# Patient Record
Sex: FEMALE | Race: WHITE | NOT HISPANIC OR LATINO | Employment: UNEMPLOYED | URBAN - METROPOLITAN AREA
[De-identification: names, ages, dates, MRNs, and addresses within clinical notes are randomized per-mention and may not be internally consistent; named-entity substitution may affect disease eponyms.]

---

## 2017-01-27 ENCOUNTER — HOSPITAL ENCOUNTER (EMERGENCY)
Facility: OTHER | Age: 42
Discharge: HOME | End: 2017-01-27
Attending: EMERGENCY MEDICINE

## 2017-01-27 VITALS
WEIGHT: 135 LBS | DIASTOLIC BLOOD PRESSURE: 69 MMHG | OXYGEN SATURATION: 98 % | HEART RATE: 84 BPM | BODY MASS INDEX: 22.49 KG/M2 | TEMPERATURE: 98 F | HEIGHT: 65 IN | SYSTOLIC BLOOD PRESSURE: 95 MMHG | RESPIRATION RATE: 18 BRPM

## 2017-01-27 DIAGNOSIS — S00.83XA FACIAL CONTUSION, INITIAL ENCOUNTER: ICD-10-CM

## 2017-01-27 DIAGNOSIS — F10.920 ALCOHOL INTOXICATION, UNCOMPLICATED: Primary | ICD-10-CM

## 2017-01-27 LAB
B-HCG UR QL: NEGATIVE
CTP QC/QA: YES

## 2017-01-27 PROCEDURE — 99284 EMERGENCY DEPT VISIT MOD MDM: CPT | Mod: 25

## 2017-01-27 PROCEDURE — 81025 URINE PREGNANCY TEST: CPT | Performed by: EMERGENCY MEDICINE

## 2017-01-27 NOTE — ED AVS SNAPSHOT
"          [facility]  [address]  [facility] [address]               Shama M Levar   2017  4:59 PM   ED    Description:  Female : 1975   Department:  [facility]           Your Care was Coordinated By:     Provider Role From To    Roland Segal MD Attending Provider 17 1701 --      Reason for Visit     Alcohol Intoxication           Diagnoses this Visit        Comments    Alcohol intoxication, uncomplicated    -  Primary     Facial contusion, initial encounter           ED Disposition     None           To Do List           Follow-up Information     Follow up with [facility]. Schedule an appointment as soon as possible for a visit in 1 week.    Specialty:  Internal Medicine    Contact information:    [address]  [facility] [address] [phone]    Additional information:    [facility], 8th Floor, [address]   Please park in [facility] Parking Garage.      [facility]     [facility] -  Assistance  Registered nurses in the [facility] provide clinical advisement, health education, appointment booking, and other advisory services.  Call for this free service at [phone].             Medications                Verify that the below list of medications is an accurate representation of the medications you are currently taking.  If none reported, the list may be blank. If incorrect, please contact your healthcare provider. Carry this list with you in case of emergency.                Clinical Reference Information           Your Vitals Were     BP Pulse Temp Resp Height Weight    86/55 84 97.6 °F (36.4 °C) (Oral) 18 5' 5" (1.651 m) 61.2 kg (135 lb)    SpO2 BMI             98% 22.47 kg/m2         Allergies as of 2017     No Known Allergies      Immunizations Administered on Date of Encounter - 2017     None      ED Micro, Lab, POCT     Start " "Ordered       Status Ordering Provider    01/27/17 1755 01/27/17 1754  POCT urine pregnancy  Once      Final result       ED Imaging Orders     Start Ordered       Status Ordering Provider    01/27/17 1755 01/27/17 1754  CT Head Without Contrast  1 time imaging      Final result     01/27/17 1755 01/27/17 1754  CT Maxillofacial Without Contrast  1 time imaging      Final result         Discharge Instructions         Alcohol Abuse  Alcoholic drinks are harmful when you have too many of them. There is no set number of drinks that defines too much. Drinking that disrupts your life or your health is called alcohol abuse. Alcohol abuse can hurt your relationships with others. You may lose friends, a spouse, or even your job. You may be abusing alcohol if any of the following are true for you:  · Duties at home or with  suffer because of drinking.  · Duties at work or in school suffer because of drinking.  · You have missed work or school because of drinking.  · You use alcohol while driving or operating machinery.  · You have legal problems such as arrests due to drinking.  · You keep drinking even though it causes serious problems in your life.  Health effects  Alcohol abuse causes health problems. Sometimes this can happen after only drinking a little." There is no set number of drinks or amount of alcohol that defines too much. The more you drink at one time, and the more often you drink determine both the short-term and long-term health effects. It affects all parts of your body and your health, including your:  · Brain. Alcohol is a central nervous system depressant. It can damage parts of the brain that affect your balance, memory, thinking, and emotions. It can cause memory loss, blackouts, depression, agitation, sleep cycle changes, and seizures. These changes may or may not be reversible.  · Heart and vascular system. Alcohol affects multiple areas. It can damage heart muscle causing cardiomyopathy, " which is a weakening and stretching of the heart muscle. This can lead to trouble breathing, an irregular heartbeat, atrial fibrillation, leg swelling, and heart failure. Alcohol use makes the blood vessels stiffen causing high blood pressure. All of these problems increase your risk of having heart attacks or strokes.  · Liver. Alcohol causes fat to build up in the liver, affecting its normal function. This increases the risk for hepatitis, leading to abdominal pain, appetite loss, jaundice, bleeding problems, liver fibrosis, and cirrhosis. This, in turn, can affect your ability to fight off infections, and can cause diabetes. The liver changes prevent it from removing toxins in your blood that can cause encephalopathy, which may show with confusion, altered level of consciousness, personality changes, memory loss, seizures, coma, and death.  · Pancreas. Alcohol can cause inflammation of the pancreas, or pancreatitis. This can cause abdominal pain, fever, and diabetes.  · Immune system. Alcohol weakens your immune system in a number of ways. It suppresses your immune system making it harder to fight infections and colds. It also increases the chance of getting pneumonia and tuberculosis.  · Cancer. Alcohol is a risk factor for developing cancer of the mouth, esophagus, pharynx, larynx, liver, and breast.  · Sexual function. Alcohol can lead to sexual problems.  Home care  The following guidelines will help you deal with alcohol abuse:  · Admit you have a problem with alcohol.  · Ask for help from your healthcare provider and trusted family members or close friends.  · Get help from people trained in dealing with alcohol abuse. This may be individual counseling or group therapy, or it may be a supervised alcohol treatment program.  · Join a self-help group for alcohol abuse such as Alcoholics Anonymous (AA).  · Avoid people who abuse alcohol or tempt you to drink.  Follow-up care  Follow up as advised by the  healthcare provider, or as advised. Contact these groups to get help:  · Alcoholics Anonymous (AA): Go to www.aa.org or check the phone book for meetings near you.  · National Alcohol and Substance Abuse Information Center (NASAIC): 859.750.3940 www.addictionL & [employer] Property [employer]ments.[employer]  · National [place] on Alcoholism and Drug Dependence (NCADD): 156-WJW-UQDO (015-6595) www.ncadd.org  Call 911  Call 911 if any of these occur:  · Trouble breathing or slow irregular breathing  · Chest pain  · Sudden weakness on one side of your body or sudden trouble speaking  · Heavy bleeding or vomiting blood  · Very drowsy or trouble awakening  · Fainting or loss of consciousness  · Rapid heart rate  · Seizure  When to seek medical care  Call your healthcare provider right away if any of these occur:   · Confusion  · Hallucinations (seeing, hearing, or feeling things that arent there)  · Pain in your upper abdomen that gets worse  · Repeated vomiting or black or tarry stools  · Severe shakiness  © 4029-4251 [employer]. [address] [place] [address]. All rights reserved. This information is not intended as a substitute for professional medical care. Always follow your healthcare professional's instructions.          Facial Contusion  A contusion is another word for a bruise. It happens when small blood vessels break open and leak blood into the nearby area. A facial contusion can result from a bump, hit, or fall. This may happen during sports or an accident. Symptoms of a contusion often include changes in skin color (bruising), swelling, and pain.   The swelling from the contusion should decrease in a few days. Bruising and pain may take several weeks to go away.   Home care  · If you have been prescribed medicines for pain, take them as directed.  · To help reduce swelling and pain, wrap a cold pack or bag of frozen peas in a thin towel. Put it on the injured area for up to 20 minutes. Do this a few times a day  until the swelling goes down.   · If you have scrapes or cuts on your face requiring stiches or other closures, care for them as directed.  · For the next 24 hours (or longer if instructed):  ¨ Dont drink alcohol, or use sedatives or medicines that make you sleepy.  ¨ Dont drive or operate machinery.  ¨ Avoid doing anything strenuous. Dont lift or strain.  ¨ Do not return to sports or other activity that could result in another head injury.  Note about concussion  Because the injury was to your head, it is possible that a concussion (mild brain injury) could result. You don't have signs of a concussion at this time. But symptoms can show up later. Be alert for signs and symptoms of a concussion. Seek emergency medical care if any of these develop over the next hours to days:  · Headache  · Nausea or vomiting  · Dizziness  · Sensitivity to light or noise  · Unusual sleepiness or grogginess  · Trouble falling asleep  · Personality changes  · Vision changes  · Memory loss  · Confusion  · Trouble walking or clumsiness  · Loss of consciousness (even for a short time)  · Inability to be awakened   Follow-up care  Follow up with your healthcare provider or our staff as directed.  When to seek medical advice  Call your healthcare provider right away if any of these occur:  · Swelling or pain that gets worse, not better  · New swelling or pain  · Warmth or drainage from the swollen area or from cuts or scrapes  · Fluid drainage or bleeding from the nose or ears  · Fever of 100.4ºF (38ºC) or higher, or as directed by your healthcare provider  © 7010-6040 [employer] [address] [place] [address], [place] [address]. All rights reserved. This information is not intended as a substitute for professional medical care. Always follow your healthcare professional's instructions.          [facility] Sign-Up     Activating your [facility] account is as easy as 1-2-3!     1) Visit [email or web address], select Sign Up Now, enter this  activation code and your date of birth, then select Next.  [email or web address]  Expires: 2/5/2017  6:37 PM      2) Create a username and password to use when you visit [facility] in the future and select a security question in case you lose your password and select Next.    3) Enter your e-mail address and click Sign Up!    Additional Information  If you have questions, please e-mail [email or web address] or call [phone] to talk to our [facility] staff. Remember, [facility] is NOT to be used for urgent needs. For medical emergencies, dial 911.         Smoking Cessation     If you would like to quit smoking:   You may be eligible for free services if you are a [place] resident and started smoking cigarettes before September 1, 1988.  Call the Smoking Cessation Trust (SCT) toll free at (166) 378-4913 or (166) 366-2122.   Call 7-791-QUIT-NOW if you do not meet the above criteria.             [facility] complies with applicable Federal civil rights laws and does not discriminate on the basis of race, color, national origin, age, disability, or sex.        Language Assistance Services     ATTENTION: Language assistance services are available, free of charge. Please call [phone].      ATENCIÓN: Si habla español, tiene a chakraborty disposición servicios gratuitos de asistencia lingüística. Llame al [phone].     CHÚ Ý: N?u b?n nói Ti?ng Vi?t, có các d?ch v? h? tr? ngôn ng? mi?n phí dành cho b?n. G?i s? [phone].

## 2017-01-27 NOTE — ED PROVIDER NOTES
"Encounter Date: 1/27/2017    SCRIBE #1 NOTE: I, Ger Nunez, am scribing for, and in the presence of,  Dr. Segal. I have scribed the entire note.       History     Chief Complaint   Patient presents with    Alcohol Intoxication     PT found at her home intoxicated and [facility] called EMS to take pt to hospital.      Review of patient's allergies indicates:  No Known Allergies  HPI Comments: Time seen by provider: 5:40 PM    This is a 41 y.o. female who presents via EMS with complaint of alcohol intoxication. EMS report that [facility] called them after finding the patient at her home after "drinking too much." She admits to smoking cigarettes but denies drug use and narcotic use. She denies any h/o withdrawal when she doesn't drink. When she got off the EMS stretcher she hit her face on the stretcher side bars, no witnessed LOC. She has bruising on her L face but states this is from injury months ago. She is a poor historian and unable to give any additional information, but denies daily EtOH use or other complaints.    The history is provided by the patient.     Past Medical History   Diagnosis Date    Alcohol abuse      No past medical history pertinent negatives.  No past surgical history on file.  No family history on file.  Social History   Substance Use Topics    Smoking status: Unknown If Ever Smoked    Smokeless tobacco: Not on file    Alcohol use 12.0 oz/week     20 Shots of liquor per week     Review of Systems   Unable to perform ROS: Other   Constitutional:        + EtOH       Physical Exam   Initial Vitals   BP Pulse Resp Temp SpO2   01/27/17 1706 01/27/17 1706 01/27/17 1706 01/27/17 1706 01/27/17 1706   99/60 105 18 97.6 °F (36.4 °C) 96 %     Physical Exam    Nursing note and vitals reviewed.  Constitutional: She appears well-developed and well-nourished. She is not diaphoretic. No distress.   Patient appears intoxicated.    HENT:   Head: Normocephalic and atraumatic.   Mouth/Throat: Oropharynx is " clear and moist.   Left forehead and periorbital ecchymosis with mild tenderness.    Eyes: Conjunctivae are normal. Pupils are equal, round, and reactive to light. Right eye exhibits no discharge. Left eye exhibits no discharge.   Neck: Normal range of motion.   No C spine tenderness.    Cardiovascular: Normal rate, regular rhythm and normal heart sounds. Exam reveals no gallop and no friction rub.    No murmur heard.  Pulmonary/Chest: Breath sounds normal. No respiratory distress. She has no wheezes. She has no rhonchi. She has no rales.   Abdominal: Soft. Bowel sounds are normal. She exhibits no distension. There is no tenderness. There is no rebound and no guarding.   Musculoskeletal: Normal range of motion. She exhibits no edema or tenderness.   Neurological: She is alert. She has normal strength. No cranial nerve deficit or sensory deficit.   No focal neuro deficits.    Skin: Skin is warm and dry. No rash and no abscess noted. No erythema. No pallor.         ED Course   Procedures  Labs Reviewed   POCT URINE PREGNANCY - Normal          X-Rays:   Independently Interpreted Readings:   Head CT: No hemorrhage.  No skull fracture.      Imaging Results         CT Maxillofacial Without Contrast (Final result) Result time:  01/27/17 20:35:31    Final result by Eddy Gruber MD (01/27/17 20:35:31)    Impression:        1. Lateral left frontal scalp mild soft tissue swelling/contusion without displaced skull fracture or acute intracranial abnormality identified.    2. Left infraorbital/malar facial mild soft tissue swelling/contusion without maxillofacial acute displaced fracture or dislocation.      Electronically signed by: EDDY GRUBER MD, MD  Date:     01/27/17  Time:    20:35     Narrative:    COMPARISON: None    FINDINGS:     Head CT: Lateral left frontal scalp mild soft tissue swelling/contusion. No displaced skull fracture. The brain appears normally formed without evidence of hemorrhage, mass, midline shift or  acute major vascular territory infarct.  Gray-white interface appears intact.  The ventricular system is normal in size for age and demonstrates no distortion by mass effect.  No extra-axial hemorrhage or mass.    Maxillofacial CT: Beam hardening with streak artifact from dental hardware slightly limits evaluation. Left infraorbital/malar facial mild soft tissue swelling/contusion. No subcutaneous emphysema or radiodense retained foreign body. Chronic appearing deformity of the anterior right lamina papyracea. Bilateral orbits appear symmetric and intact. Bilateral orbital rims, zygomatic arches, pterygoid plates, mandibular condyles, TMJs and nasal bones appear symmetric and intact. Bony nasal septum is relatively midline and intact. There are a few scattered missing teeth. Minimal mucosal thickening of the left maxillary sinus. Inner ears and mastoid air cells are clear. Included airway is midline and patent. Bilateral parotid and submandibular glands are within normal limits. Several scattered subcentimeter lymph nodes along the bilateral cervical chains. Include upper cervical spine appears intact. Minimal atherosclerotic calcifications at the left carotid bifurcation.            CT Head Without Contrast (Final result) Result time:  01/27/17 20:35:31    Final result by Eddy Gruber MD (01/27/17 20:35:31)    Impression:        1. Lateral left frontal scalp mild soft tissue swelling/contusion without displaced skull fracture or acute intracranial abnormality identified.    2. Left infraorbital/malar facial mild soft tissue swelling/contusion without maxillofacial acute displaced fracture or dislocation.      Electronically signed by: EDDY GRUBER MD, MD  Date:     01/27/17  Time:    20:35     Narrative:    COMPARISON: None    FINDINGS:     Head CT: Lateral left frontal scalp mild soft tissue swelling/contusion. No displaced skull fracture. The brain appears normally formed without evidence of hemorrhage, mass,  midline shift or acute major vascular territory infarct.  Gray-white interface appears intact.  The ventricular system is normal in size for age and demonstrates no distortion by mass effect.  No extra-axial hemorrhage or mass.    Maxillofacial CT: Beam hardening with streak artifact from dental hardware slightly limits evaluation. Left infraorbital/malar facial mild soft tissue swelling/contusion. No subcutaneous emphysema or radiodense retained foreign body. Chronic appearing deformity of the anterior right lamina papyracea. Bilateral orbits appear symmetric and intact. Bilateral orbital rims, zygomatic arches, pterygoid plates, mandibular condyles, TMJs and nasal bones appear symmetric and intact. Bony nasal septum is relatively midline and intact. There are a few scattered missing teeth. Minimal mucosal thickening of the left maxillary sinus. Inner ears and mastoid air cells are clear. Included airway is midline and patent. Bilateral parotid and submandibular glands are within normal limits. Several scattered subcentimeter lymph nodes along the bilateral cervical chains. Include upper cervical spine appears intact. Minimal atherosclerotic calcifications at the left carotid bifurcation.             Medical Decision Making:   History:   Old Medical Records: I decided to obtain old medical records.  Independently Interpreted Test(s):   I have ordered and independently interpreted X-rays - see prior notes.  Clinical Tests:   Lab Tests: Ordered and Reviewed  Radiological Study: Ordered and Reviewed  ED Management:      Patient with Hx of alcohol abuse brought in by EMS for public intoxication. She has signs of facial and head trauma though it is unclear if from previous injury or today. I will get a CT head and facial and observe until sober. No other complaints or findings to warrant labs or further workup.    Update:  CT with no facial fracture or intra-cranial injury. Pt on re-assess ambulatory with no imbalance,  and A+O x4. She states she has to get to work on [employer] and discharged in stable condition.                Scribe Attestation:   Scribe #1: I performed the above scribed service and the documentation accurately describes the services I performed. I attest to the accuracy of the note.    Attending Attestation:           Physician Attestation for Scribe:  Physician Attestation Statement for Scribe #1: I, Dr. Segal, reviewed documentation, as scribed by Ger Nunez in my presence, and it is both accurate and complete.                 ED Course     Clinical Impression:     1. Alcohol intoxication, uncomplicated    2. Facial contusion, initial encounter               Roland Segal MD  01/27/17 7023

## 2017-01-27 NOTE — ED NOTES
Pt had witnessed fall with EMS on unit. Tech states that EMS unbuckled pt from stretcher and assisted her to restroom. Upon pt entering restroom and using restroom, pt pulled up her pants, and walked toward door and slipped and hit L side of her head on the assist bar. Pt assisted up by tech. Pt awake, alert, and responding as baseline upon entering ED with EMS. Pt has bruising noted to L side oh head near eye. MD notified and at bedside. No deformities noted. Will continue to monitor.

## 2017-01-28 NOTE — ED NOTES
"Rounding on pt complete. Pt is asleep arouses with stimulation. PT breath has strong odor of ETOH. Asked pt if she would be able to provide urine sample and she states "No." No needs assessed at this time, pt is clearly visible from the nurses station.   "

## 2017-01-28 NOTE — DISCHARGE INSTRUCTIONS
"  Alcohol Abuse  Alcoholic drinks are harmful when you have too many of them. There is no set number of drinks that defines too much. Drinking that disrupts your life or your health is called alcohol abuse. Alcohol abuse can hurt your relationships with others. You may lose friends, a spouse, or even your job. You may be abusing alcohol if any of the following are true for you:  · Duties at home or with  suffer because of drinking.  · Duties at work or in school suffer because of drinking.  · You have missed work or school because of drinking.  · You use alcohol while driving or operating machinery.  · You have legal problems such as arrests due to drinking.  · You keep drinking even though it causes serious problems in your life.  Health effects  Alcohol abuse causes health problems. Sometimes this can happen after only drinking a little." There is no set number of drinks or amount of alcohol that defines too much. The more you drink at one time, and the more often you drink determine both the short-term and long-term health effects. It affects all parts of your body and your health, including your:  · Brain. Alcohol is a central nervous system depressant. It can damage parts of the brain that affect your balance, memory, thinking, and emotions. It can cause memory loss, blackouts, depression, agitation, sleep cycle changes, and seizures. These changes may or may not be reversible.  · Heart and vascular system. Alcohol affects multiple areas. It can damage heart muscle causing cardiomyopathy, which is a weakening and stretching of the heart muscle. This can lead to trouble breathing, an irregular heartbeat, atrial fibrillation, leg swelling, and heart failure. Alcohol use makes the blood vessels stiffen causing high blood pressure. All of these problems increase your risk of having heart attacks or strokes.  · Liver. Alcohol causes fat to build up in the liver, affecting its normal function. This " increases the risk for hepatitis, leading to abdominal pain, appetite loss, jaundice, bleeding problems, liver fibrosis, and cirrhosis. This, in turn, can affect your ability to fight off infections, and can cause diabetes. The liver changes prevent it from removing toxins in your blood that can cause encephalopathy, which may show with confusion, altered level of consciousness, personality changes, memory loss, seizures, coma, and death.  · Pancreas. Alcohol can cause inflammation of the pancreas, or pancreatitis. This can cause abdominal pain, fever, and diabetes.  · Immune system. Alcohol weakens your immune system in a number of ways. It suppresses your immune system making it harder to fight infections and colds. It also increases the chance of getting pneumonia and tuberculosis.  · Cancer. Alcohol is a risk factor for developing cancer of the mouth, esophagus, pharynx, larynx, liver, and breast.  · Sexual function. Alcohol can lead to sexual problems.  Home care  The following guidelines will help you deal with alcohol abuse:  · Admit you have a problem with alcohol.  · Ask for help from your healthcare provider and trusted family members or close friends.  · Get help from people trained in dealing with alcohol abuse. This may be individual counseling or group therapy, or it may be a supervised alcohol treatment program.  · Join a self-help group for alcohol abuse such as Alcoholics Anonymous (AA).  · Avoid people who abuse alcohol or tempt you to drink.  Follow-up care  Follow up as advised by the healthcare provider, or as advised. Contact these groups to get help:  · Alcoholics Anonymous (AA): Go to www.aa.org or check the phone book for meetings near you.  · National Alcohol and Substance Abuse Information Center (NASAIC): 538.295.4030 www.addictioncareoptions.com  · National [place] on Alcoholism and Drug Dependence (NCADD): 743-HCC-LPFP (845-8722) www.ncadd.org  Call 911  Call 911 if any of these  occur:  · Trouble breathing or slow irregular breathing  · Chest pain  · Sudden weakness on one side of your body or sudden trouble speaking  · Heavy bleeding or vomiting blood  · Very drowsy or trouble awakening  · Fainting or loss of consciousness  · Rapid heart rate  · Seizure  When to seek medical care  Call your healthcare provider right away if any of these occur:   · Confusion  · Hallucinations (seeing, hearing, or feeling things that arent there)  · Pain in your upper abdomen that gets worse  · Repeated vomiting or black or tarry stools  · Severe shakiness  © 8941-9996 [employer]. [address] [place] [address]. All rights reserved. This information is not intended as a substitute for professional medical care. Always follow your healthcare professional's instructions.          Facial Contusion  A contusion is another word for a bruise. It happens when small blood vessels break open and leak blood into the nearby area. A facial contusion can result from a bump, hit, or fall. This may happen during sports or an accident. Symptoms of a contusion often include changes in skin color (bruising), swelling, and pain.   The swelling from the contusion should decrease in a few days. Bruising and pain may take several weeks to go away.   Home care  · If you have been prescribed medicines for pain, take them as directed.  · To help reduce swelling and pain, wrap a cold pack or bag of frozen peas in a thin towel. Put it on the injured area for up to 20 minutes. Do this a few times a day until the swelling goes down.   · If you have scrapes or cuts on your face requiring stiches or other closures, care for them as directed.  · For the next 24 hours (or longer if instructed):  ¨ Dont drink alcohol, or use sedatives or medicines that make you sleepy.  ¨ Dont drive or operate machinery.  ¨ Avoid doing anything strenuous. Dont lift or strain.  ¨ Do not return to sports or other activity that  could result in another head injury.  Note about concussion  Because the injury was to your head, it is possible that a concussion (mild brain injury) could result. You don't have signs of a concussion at this time. But symptoms can show up later. Be alert for signs and symptoms of a concussion. Seek emergency medical care if any of these develop over the next hours to days:  · Headache  · Nausea or vomiting  · Dizziness  · Sensitivity to light or noise  · Unusual sleepiness or grogginess  · Trouble falling asleep  · Personality changes  · Vision changes  · Memory loss  · Confusion  · Trouble walking or clumsiness  · Loss of consciousness (even for a short time)  · Inability to be awakened   Follow-up care  Follow up with your healthcare provider or our staff as directed.  When to seek medical advice  Call your healthcare provider right away if any of these occur:  · Swelling or pain that gets worse, not better  · New swelling or pain  · Warmth or drainage from the swollen area or from cuts or scrapes  · Fluid drainage or bleeding from the nose or ears  · Fever of 100.4ºF (38ºC) or higher, or as directed by your healthcare provider  © 3504-3077 [employer]. [address] [place] [address]. All rights reserved. This information is not intended as a substitute for professional medical care. Always follow your healthcare professional's instructions.

## 2017-01-28 NOTE — ED NOTES
Bedside report rec'ed care assumed. Pt resting quietly on stretcher. Arouses to minimal stimulation. No complaints at this time. Resp even and unlabored. On pulse ox and NIBP.

## 2017-01-28 NOTE — ED NOTES
CT came for pt, and pt has not provided urine, MD notified and stated to wait on CT until pt provides urine.

## 2017-01-28 NOTE — ED NOTES
Pt ambulated to bathroom with steady gait and voided without difficulty, nurse remained in bathroom with patient to maintain safety. Pt ambulated back to room with no difficulty and given water to drink, pt is talking with clear speech and radiology notified pt is ready to go to CT now. Pt taken by stretched to CT with 2 radiology techs present.

## 2017-05-23 ENCOUNTER — HOSPITAL ENCOUNTER (EMERGENCY)
Facility: OTHER | Age: 42
Discharge: HOME | End: 2017-05-23
Attending: EMERGENCY MEDICINE
Payer: MEDICAID

## 2017-05-23 VITALS
DIASTOLIC BLOOD PRESSURE: 64 MMHG | HEIGHT: 60 IN | OXYGEN SATURATION: 99 % | BODY MASS INDEX: 24.15 KG/M2 | RESPIRATION RATE: 17 BRPM | HEART RATE: 96 BPM | TEMPERATURE: 98 F | WEIGHT: 123 LBS | SYSTOLIC BLOOD PRESSURE: 119 MMHG

## 2017-05-23 DIAGNOSIS — F10.920 ALCOHOL INTOXICATION, UNCOMPLICATED: Primary | ICD-10-CM

## 2017-05-23 PROCEDURE — 99283 EMERGENCY DEPT VISIT LOW MDM: CPT

## 2017-05-23 NOTE — ED PROVIDER NOTES
Encounter Date: 5/23/2017    SCRIBE #1 NOTE: I, Cecilia Ivan, am scribing for, and in the presence of,  Dr. Clayton. I have scribed the entire note.       History     Chief Complaint   Patient presents with    Alcohol Intoxication     [place] and was unable to ambulate, wit slurred speech, and nystagmus.     Review of patient's allergies indicates:  No Known Allergies  Time seen by provider: 5:33 PM    This is a 42 y.o. female who presents with alcohol intoxication. As per EMS the patient's symptoms began hours ago. EMS note [place]. The patient admits to drinking several alcoholic beverages. She reports she currently has no complaints. The patient does not admit to any chest pain, shortness of breath, palpitations, abdominal pain, nausea, vomiting, diarrhea, urinary symptoms, fever or chills. As per EMS the patient has no signs of trauma.       The history is provided by the EMS personnel and the patient.     Past Medical History:   Diagnosis Date    Alcohol abuse      History reviewed. No pertinent surgical history.  History reviewed. No pertinent family history.  Social History   Substance Use Topics    Smoking status: Unknown If Ever Smoked    Smokeless tobacco: Not on file    Alcohol use 12.0 oz/week     20 Shots of liquor per week     Review of Systems   Constitutional: Negative for chills and fever.   HENT: Negative for congestion and sore throat.    Eyes: Negative for redness and visual disturbance.   Respiratory: Negative for cough and shortness of breath.    Cardiovascular: Negative for chest pain and palpitations.   Gastrointestinal: Negative for abdominal pain, diarrhea, nausea and vomiting.   Genitourinary: Negative for dysuria.   Musculoskeletal: Negative for back pain.   Skin: Negative for rash.   Neurological: Negative for weakness and headaches.   Psychiatric/Behavioral: Negative for confusion.       Physical Exam      Initial Vitals [05/23/17 1731]   BP Pulse Resp Temp SpO2   (!) 93/55 99 16 98.4 °F (36.9 °C) (!) 92 %     Physical Exam    Nursing note and vitals reviewed.  Constitutional: She appears well-developed and well-nourished. She is not diaphoretic. No distress.   HENT:   Head: Normocephalic and atraumatic.   Right Ear: External ear normal.   Left Ear: External ear normal.   Eyes: Conjunctivae and EOM are normal.   Neck: Normal range of motion. Neck supple.   Cardiovascular: Normal rate, regular rhythm and normal heart sounds. Exam reveals no gallop and no friction rub.    No murmur heard.  Pulmonary/Chest: Breath sounds normal. She has no wheezes. She has no rhonchi. She has no rales.   Abdominal: Soft. Bowel sounds are normal. There is no tenderness. There is no rebound and no guarding.   Musculoskeletal: Normal range of motion. She exhibits no edema or tenderness.   Lymphadenopathy:     She has no cervical adenopathy.   Neurological: She is alert and oriented to person, place, and time. She has normal strength.   Skin: Skin is warm and dry. No rash noted.         ED Course   Procedures  Labs Reviewed - No data to display          Medical Decision Making:   ED Management:  Shama Cristobal is a 42 y.o. female Smells of alcohol and appears to be clinically intoxicated.  Vital signs otherwise normal. No signs of trauma.  At this time I do not feel any urgent intervention such as IV fluids or any blood work is indicated.  There is no indication for radiologic studies at this time.  We'll observe the patient and reevaluate when he is clinically sober, with no slurred speech, being alert and oriented, and able to ambulate on their own.     9:03 PM I have reevaluated the patient.  They are now alert and oriented person place and time.  They are answering all questions appropriately.  They have no slurred speech and having normal non-ataxic gait.  At this time I feel they're clinically sober.  They have medical  decision-making capacity and will be discharged.               Attending Attestation:           Physician Attestation for Scribe:  Physician Attestation Statement for Scribe #1: I, Dr. Clayton, reviewed documentation, as scribed by Cecilia Ivan in my presence, and it is both accurate and complete.                 ED Course     Clinical Impression:     1. Alcohol intoxication, uncomplicated              Saeid Clayton, DO  05/23/17 0730

## 2017-05-23 NOTE — ED TRIAGE NOTES
Pt transferred from EMS stretcher to bed 3, pt reports to ED via [facility] c/o ETOH intoxication. [place]. Pt appears dishelved with slurred speech, + nystagmus, admits to ETOH consumption, denies drug use. Spo2 97% on RA.

## 2017-05-23 NOTE — ED NOTES
No change in pt status; pt sleeping in stretcher with even, unlabored respiration. Bed in lowest, locked position, side rails up x 2. Remains near nurse's station for close observation.

## 2017-05-24 ENCOUNTER — HOSPITAL ENCOUNTER (EMERGENCY)
Facility: OTHER | Age: 42
Discharge: HOME | End: 2017-05-25
Attending: EMERGENCY MEDICINE
Payer: MEDICAID

## 2017-05-24 DIAGNOSIS — F10.920 ALCOHOL INTOXICATION, UNCOMPLICATED: Primary | ICD-10-CM

## 2017-05-24 PROCEDURE — 99283 EMERGENCY DEPT VISIT LOW MDM: CPT

## 2017-05-24 NOTE — ED PROVIDER NOTES
Encounter Date: 5/24/2017    SCRIBE #1 NOTE: I, Kimberlyhalima Vergaraher, am scribing for, and in the presence of,  Dr. Navarro I have scribed the entire note.       History     Chief Complaint   Patient presents with    Alcohol Intoxication     pt was passed out [address]  nopd called EMS to pick pt up     Review of patient's allergies indicates:  No Known Allergies  Time seen by provider: 6:04 PM    This is a 42 y.o. female who presents to the ED by EMS because of alcohol intoxication. The patient was found in the [facility] by [place] [employer], and EMS was called. She admits to drinking. The patient has a history of alcohol abuse. She denies medical problems and pain. She denies any drug use.  She denies nausea, vomiting, diarrhea, SOB, or chest pain. The patient has no complaints at this time.       The history is provided by the patient. The history is limited by the condition of the patient.     Past Medical History:   Diagnosis Date    Alcohol abuse      History reviewed. No pertinent surgical history.  History reviewed. No pertinent family history.  Social History   Substance Use Topics    Smoking status: Unknown If Ever Smoked    Smokeless tobacco: Not on file    Alcohol use 12.0 oz/week     20 Shots of liquor per week     Review of Systems   Unable to perform ROS: Other (Severe alcohol intoxication)       Physical Exam     Initial Vitals [05/24/17 1802]   BP Pulse Resp Temp SpO2   102/66 91 18 97.8 °F (36.6 °C) 96 %     Physical Exam    Nursing note and vitals reviewed.  Constitutional: She appears well-developed and well-nourished. She is not diaphoretic. No distress.   Sleepy but arousable   HENT:   Head: Normocephalic and atraumatic.   Right Ear: External ear normal.   Left Ear: External ear normal.   Mouth/Throat: Oropharynx is clear and moist.   Eyes: Conjunctivae are normal. Pupils are equal, round, and reactive to light. Right eye exhibits no discharge. Left eye exhibits no  discharge.   Nystagmus   Neck: Normal range of motion. Neck supple. No JVD present.   Cardiovascular: Normal rate, regular rhythm, normal heart sounds and intact distal pulses. Exam reveals no friction rub.    Pulmonary/Chest: Breath sounds normal. No respiratory distress. She has no wheezes. She has no rhonchi. She has no rales.   Abdominal: Soft. Bowel sounds are normal. She exhibits no distension. There is no tenderness. There is no rebound and no guarding.   Musculoskeletal: Normal range of motion. She exhibits no edema or tenderness.   Neurological: She is alert. She has normal strength. No sensory deficit.   Responsive to verbal stimuli. Slurred speech.   Skin: Skin is warm and dry. Capillary refill takes less than 2 seconds. No rash noted.         ED Course   Procedures  Labs Reviewed - No data to display          Medical Decision Making:   History:   I obtained history from: EMS provider.  Old Medical Records: I decided to obtain old medical records.  Old Records Summarized: other records.  Initial Assessment:   6:04PM:  Pt is a 41 y/o F who presents to ED by EMS for ETOH intoxication.  Pt appears well, nontoxic.  She is responsive to verbal stimuli and protecting her airway with no issues. She does admit to ETOH use.  Will continue to follow and reassess.      11:00 PM:  Pt remains arousable but unable to ambulate independently.  I have discussed this patient with Dr. Boucher who will assume care of the patient. Will continue to follow.              Scribe Attestation:   Scribe #1: I performed the above scribed service and the documentation accurately describes the services I performed. I attest to the accuracy of the note.    Attending Attestation:           Physician Attestation for Scribe:  Physician Attestation Statement for Scribe #1: I, Dr. Desai, reviewed documentation, as scribed by Kimberly Cabrales in my presence, and it is both accurate and complete.                 ED Course     Clinical Impression:      1. Alcohol intoxication, uncomplicated                Kita Desai MD  05/24/17 4407

## 2017-05-25 VITALS
BODY MASS INDEX: 23.22 KG/M2 | HEIGHT: 61 IN | TEMPERATURE: 98 F | OXYGEN SATURATION: 97 % | HEART RATE: 82 BPM | WEIGHT: 123 LBS | DIASTOLIC BLOOD PRESSURE: 75 MMHG | RESPIRATION RATE: 18 BRPM | SYSTOLIC BLOOD PRESSURE: 112 MMHG

## 2017-05-25 NOTE — ED NOTES
Received report from [facility]  Pt is sleeping in stretcher.  Bed in lowest position, side rails up x2, call light in reach.

## 2020-04-08 ENCOUNTER — HOSPITAL ENCOUNTER (EMERGENCY)
Facility: HOSPITAL | Age: 45
Discharge: HOME | End: 2020-04-08
Attending: EMERGENCY MEDICINE
Payer: MEDICAID

## 2020-04-08 VITALS
SYSTOLIC BLOOD PRESSURE: 96 MMHG | WEIGHT: 130 LBS | TEMPERATURE: 98 F | HEART RATE: 96 BPM | DIASTOLIC BLOOD PRESSURE: 65 MMHG | RESPIRATION RATE: 20 BRPM | OXYGEN SATURATION: 98 % | BODY MASS INDEX: 25.52 KG/M2 | HEIGHT: 60 IN

## 2020-04-08 DIAGNOSIS — R41.82 ALTERED MENTAL STATUS: ICD-10-CM

## 2020-04-08 DIAGNOSIS — F10.920 ALCOHOLIC INTOXICATION WITHOUT COMPLICATION: Primary | ICD-10-CM

## 2020-04-08 PROCEDURE — 99283 EMERGENCY DEPT VISIT LOW MDM: CPT | Mod: 25

## 2020-04-08 NOTE — ED NOTES
Attempted to call pt's mother, Afshan, at [phone]. No answer. Voicemail left. Will continue to monitor.

## 2020-04-08 NOTE — ED PROVIDER NOTES
Encounter Date: 4/8/2020    SCRIBE #1 NOTE: I, Yakelin Boogie, am scribing for, and in the presence of,  Tai Arteaga MD. I have scribed the following portions of the note - Other sections scribed: HPI,ROS,PE.       History     Chief Complaint   Patient presents with    Alcohol Intoxication     EMS reports pt sleeping on the side of the road. apparent alcohol intoxication. pt with slurred speech.      44-year-old female with past medical history of alcohol abuse presenting to ED via EMS for alcohol intoxication. Patient was found [place] bus stop arguing with friend and had unsteady gait. She denies any pain, falls, or sick contacts.  No reported trauma.  Patient has no complaints.  Patient has visited ED on several occasions for alcohol intoxication.  History is limited due to alcohol intoxication.      The history is provided by the EMS personnel and the patient. History limited by: alcohol intoxication      Review of patient's allergies indicates:  No Known Allergies  Past Medical History:   Diagnosis Date    Alcohol abuse      No past surgical history on file.  History reviewed. No pertinent family history.  Social History     Tobacco Use    Smoking status: Unknown If Ever Smoked   Substance Use Topics    Alcohol use: Yes     Alcohol/week: 20.0 standard drinks     Types: 20 Shots of liquor per week    Drug use: No     Review of Systems   Unable to perform ROS: Other (alcohol intoxication )   Constitutional: Negative for diaphoresis.   Cardiovascular: Negative for chest pain.   Gastrointestinal: Negative for abdominal pain and vomiting.   Musculoskeletal: Negative for back pain and neck pain.   Skin: Negative for wound.   Neurological: Negative for syncope.   Psychiatric/Behavioral: Positive for confusion.       Physical Exam     Initial Vitals [04/08/20 1737]   BP Pulse Resp Temp SpO2   (!) 87/50 87 16 97.6 °F (36.4 °C) 95 %      MAP       --         Physical Exam    Nursing note and vitals  reviewed.  Constitutional: She appears well-developed and well-nourished. She is not diaphoretic. No distress.   HENT:   Head: Normocephalic and atraumatic.   Nose: Nose normal.   Mouth/Throat: Oropharynx is clear and moist.   Eyes: Conjunctivae and EOM are normal. Pupils are equal, round, and reactive to light. Right eye exhibits no discharge. Left eye exhibits no discharge. No scleral icterus.   Neck: Normal range of motion. Neck supple. No tracheal deviation present.   Cardiovascular: Normal rate, regular rhythm and normal heart sounds.   No murmur heard.  Pulmonary/Chest: Breath sounds normal. No stridor. No respiratory distress. She has no wheezes. She has no rhonchi. She has no rales.   Abdominal: She exhibits no distension.   Musculoskeletal: Normal range of motion. She exhibits no edema.   Neurological: She is alert and oriented to person, place, and time. She has normal strength. No cranial nerve deficit or sensory deficit. GCS score is 15. GCS eye subscore is 4. GCS verbal subscore is 5. GCS motor subscore is 6.   Slurred speech.  Ataxic gait.   Skin: Skin is warm and dry.   Psychiatric: Her behavior is normal. Judgment and thought content normal.   Intoxicated.  Flat affect.  Depressed mood.         ED Course   Procedures  Labs Reviewed - No data to display       Imaging Results          X-Ray Chest 1 View (Final result)  Result time 04/08/20 20:21:28    Final result by Jorden Gruber MD (04/08/20 20:21:28)                 Impression:      No radiographic acute intrathoracic process seen on this single view.      Electronically signed by: Jorden Gruber MD  Date:    04/08/2020  Time:    20:21             Narrative:    EXAMINATION:  XR CHEST 1 VIEW    CLINICAL HISTORY:  Altered mental status, unspecified    TECHNIQUE:  Single frontal view of the chest was performed.    COMPARISON:  None    FINDINGS:  Patient is slightly rotated.The lungs are clear, with normal appearance of pulmonary vasculature and no  pleural effusion or pneumothorax.    The cardiac silhouette is normal in size. The hilar and mediastinal contours are unremarkable.    Bones are intact.  PA and lateral views can be obtained.                                 Medical Decision Making:   History:   Old Medical Records: I decided to obtain old medical records.  Initial Assessment:   44-year-old female with past medical history of alcohol abuse presenting to ED via EMS for alcohol intoxication. Patient was found at a bus stop arguing with friend and had unsteady gait. Patient has slurred speech.  No evidence of trauma on exam.  No acute psychiatric disorder.  Patient is without complaints.  Patient is stable for discharge with a reliable mode of transportation once her gait is improved.  Differential Diagnosis:   Differential diagnosis includes but is not limited to: alcohol intoxication, drug abuse.   Clinical Tests:   Radiological Study: Ordered and Reviewed  ED Management:  1930:  WHILE PATIENT WAS WAITING ARRIVED HER BLOOD PRESSURE DROPPED AND HER O2 SATS DROPPED TO THE LOW 80S.  PATIENT IS MORE SOMNOLENT.  WILL CHECK LAB WORK AND GIVE IV FLUIDS.  WILL CHECK CHEST X-RAY.    2000:  Patient refused IV.  Blood pressure in O2 sats improved with improved mental status.  Suspect hypotension and hypoxia was due to her severe alcohol intoxication.  Will continue to observe.    2120:  Chest x-ray clear.  Blood pressure has improved.  In patient's mental status is slowly improving.  Cannot find a ride for her.  She will have to be able to ambulate before she can be discharged.    2150:  Patient's mental status improved.  Patient mild-to-moderately intoxicated.  Gait is stable.  Patient is alert orient x4.  Patient continues to have no complaints.  Will discharge patient with a ride in a cab or an [employer].            Scribe Attestation:   Scribe #1: I performed the above scribed service and the documentation accurately describes the services I performed. I attest  to the accuracy of the note.                          Clinical Impression:       ICD-10-CM ICD-9-CM   1. Alcoholic intoxication without complication F10.920 305.00   2. Altered mental status R41.82 780.97         Disposition:   Disposition: Discharged  Condition: Stable     I, Tai Arteaga MD, personally performed the services described in this documentation. All medical record entries made by the scribe were at my direction and in my presence. I have reviewed the chart and agree that the record reflects my personal performance and is accurate and complete.                      Tai Arteaga MD  04/08/20 2128       Tai Arteaga MD  04/08/20 3290

## 2020-04-09 NOTE — ED NOTES
Spoke to patient about starting an iv and giving fluids. Patient becomes combative and refuses.MD notified, patient to be monitored at this time, in keeping with her wishes. Vitals currently will stable. Will monitor closely.

## 2020-07-13 ENCOUNTER — HOSPITAL ENCOUNTER (INPATIENT)
Facility: HOSPITAL | Age: 45
LOS: 3 days | Discharge: HOME | DRG: 897 | End: 2020-07-16
Attending: EMERGENCY MEDICINE | Admitting: INTERNAL MEDICINE
Payer: MEDICAID

## 2020-07-13 DIAGNOSIS — R00.0 TACHYCARDIA: ICD-10-CM

## 2020-07-13 DIAGNOSIS — E87.6 HYPOKALEMIA: ICD-10-CM

## 2020-07-13 DIAGNOSIS — F10.931 ALCOHOL WITHDRAWAL SYNDROME, WITH DELIRIUM: Primary | ICD-10-CM

## 2020-07-13 DIAGNOSIS — E83.42 HYPOMAGNESEMIA: ICD-10-CM

## 2020-07-13 PROBLEM — E86.0 DEHYDRATION: Status: ACTIVE | Noted: 2020-07-13

## 2020-07-13 PROBLEM — R94.31 PROLONGED Q-T INTERVAL ON ECG: Status: ACTIVE | Noted: 2020-07-13

## 2020-07-13 PROBLEM — F10.14 ALCOHOL ABUSE WITH ALCOHOL-INDUCED MOOD DISORDER: Status: ACTIVE | Noted: 2020-07-13

## 2020-07-13 PROBLEM — F10.939 ALCOHOL WITHDRAWAL SYNDROME WITH COMPLICATION: Status: ACTIVE | Noted: 2020-07-13

## 2020-07-13 PROBLEM — F17.210 CIGARETTE NICOTINE DEPENDENCE WITHOUT COMPLICATION: Status: ACTIVE | Noted: 2020-07-13

## 2020-07-13 PROBLEM — R82.90 ABNORMAL URINALYSIS: Status: ACTIVE | Noted: 2020-07-13

## 2020-07-13 PROBLEM — R17 SERUM TOTAL BILIRUBIN ELEVATED: Status: ACTIVE | Noted: 2020-07-13

## 2020-07-13 PROBLEM — F31.9 BIPOLAR 1 DISORDER: Status: ACTIVE | Noted: 2020-07-13

## 2020-07-13 LAB
ALBUMIN SERPL BCP-MCNC: 4.2 G/DL (ref 3.5–5.2)
ALP SERPL-CCNC: 84 U/L (ref 55–135)
ALT SERPL W/O P-5'-P-CCNC: 28 U/L (ref 10–44)
AMPHET+METHAMPHET UR QL: NEGATIVE
ANION GAP SERPL CALC-SCNC: 17 MMOL/L (ref 8–16)
APAP SERPL-MCNC: <3 UG/ML (ref 10–20)
APTT BLDCRRT: 29.2 SEC (ref 21–32)
AST SERPL-CCNC: 50 U/L (ref 10–40)
B-HCG UR QL: NEGATIVE
BACTERIA #/AREA URNS HPF: ABNORMAL /HPF
BARBITURATES UR QL SCN>200 NG/ML: NEGATIVE
BASOPHILS # BLD AUTO: 0.02 K/UL (ref 0–0.2)
BASOPHILS NFR BLD: 0.2 % (ref 0–1.9)
BENZODIAZ UR QL SCN>200 NG/ML: NEGATIVE
BILIRUB SERPL-MCNC: 1.3 MG/DL (ref 0.1–1)
BILIRUB UR QL STRIP: ABNORMAL
BNP SERPL-MCNC: 80 PG/ML (ref 0–99)
BUN SERPL-MCNC: 20 MG/DL (ref 6–20)
BZE UR QL SCN: NEGATIVE
CALCIUM SERPL-MCNC: 9.4 MG/DL (ref 8.7–10.5)
CANNABINOIDS UR QL SCN: NEGATIVE
CHLORIDE SERPL-SCNC: 81 MMOL/L (ref 95–110)
CLARITY UR: ABNORMAL
CO2 SERPL-SCNC: 37 MMOL/L (ref 23–29)
COLOR UR: ABNORMAL
CREAT SERPL-MCNC: 0.7 MG/DL (ref 0.5–1.4)
CREAT UR-MCNC: 267.9 MG/DL (ref 15–325)
CTP QC/QA: YES
D DIMER PPP IA.FEU-MCNC: 0.29 MG/L FEU
DIFFERENTIAL METHOD: ABNORMAL
EOSINOPHIL # BLD AUTO: 0 K/UL (ref 0–0.5)
EOSINOPHIL NFR BLD: 0.1 % (ref 0–8)
ERYTHROCYTE [DISTWIDTH] IN BLOOD BY AUTOMATED COUNT: 13.3 % (ref 11.5–14.5)
EST. GFR  (AFRICAN AMERICAN): >60 ML/MIN/1.73 M^2
EST. GFR  (NON AFRICAN AMERICAN): >60 ML/MIN/1.73 M^2
ETHANOL SERPL-MCNC: <10 MG/DL
GLUCOSE SERPL-MCNC: 94 MG/DL (ref 70–110)
GLUCOSE UR QL STRIP: NEGATIVE
HCT VFR BLD AUTO: 41.4 % (ref 37–48.5)
HGB BLD-MCNC: 14.4 G/DL (ref 12–16)
HGB UR QL STRIP: NEGATIVE
HYALINE CASTS #/AREA URNS LPF: 5 /LPF
IMM GRANULOCYTES # BLD AUTO: 0.03 K/UL (ref 0–0.04)
IMM GRANULOCYTES NFR BLD AUTO: 0.3 % (ref 0–0.5)
INR PPP: 1 (ref 0.8–1.2)
KETONES UR QL STRIP: ABNORMAL
LEUKOCYTE ESTERASE UR QL STRIP: ABNORMAL
LIPASE SERPL-CCNC: 22 U/L (ref 4–60)
LYMPHOCYTES # BLD AUTO: 1.9 K/UL (ref 1–4.8)
LYMPHOCYTES NFR BLD: 20.2 % (ref 18–48)
MAGNESIUM SERPL-MCNC: 1.5 MG/DL (ref 1.6–2.6)
MCH RBC QN AUTO: 33.5 PG (ref 27–31)
MCHC RBC AUTO-ENTMCNC: 34.8 G/DL (ref 32–36)
MCV RBC AUTO: 96 FL (ref 82–98)
METHADONE UR QL SCN>300 NG/ML: NEGATIVE
MICROSCOPIC COMMENT: ABNORMAL
MONOCYTES # BLD AUTO: 0.7 K/UL (ref 0.3–1)
MONOCYTES NFR BLD: 7 % (ref 4–15)
NEUTROPHILS # BLD AUTO: 6.8 K/UL (ref 1.8–7.7)
NEUTROPHILS NFR BLD: 72.2 % (ref 38–73)
NITRITE UR QL STRIP: NEGATIVE
NRBC BLD-RTO: 0 /100 WBC
OPIATES UR QL SCN: NEGATIVE
PCP UR QL SCN>25 NG/ML: NEGATIVE
PH UR STRIP: 8 [PH] (ref 5–8)
PLATELET # BLD AUTO: 161 K/UL (ref 150–350)
PMV BLD AUTO: 11.5 FL (ref 9.2–12.9)
POTASSIUM SERPL-SCNC: 2.8 MMOL/L (ref 3.5–5.1)
PROT SERPL-MCNC: 7.3 G/DL (ref 6–8.4)
PROT UR QL STRIP: ABNORMAL
PROTHROMBIN TIME: 10.9 SEC (ref 9–12.5)
RBC # BLD AUTO: 4.3 M/UL (ref 4–5.4)
RBC #/AREA URNS HPF: 0 /HPF (ref 0–4)
SALICYLATES SERPL-MCNC: <5 MG/DL (ref 15–30)
SARS-COV-2 RDRP RESP QL NAA+PROBE: NEGATIVE
SODIUM SERPL-SCNC: 135 MMOL/L (ref 136–145)
SP GR UR STRIP: 1.03 (ref 1–1.03)
SQUAMOUS #/AREA URNS HPF: 3 /HPF
TOXICOLOGY INFORMATION: NORMAL
TROPONIN I SERPL DL<=0.01 NG/ML-MCNC: <0.006 NG/ML (ref 0–0.03)
TSH SERPL DL<=0.005 MIU/L-ACNC: 1.67 UIU/ML (ref 0.4–4)
URN SPEC COLLECT METH UR: ABNORMAL
UROBILINOGEN UR STRIP-ACNC: ABNORMAL EU/DL
WBC # BLD AUTO: 9.46 K/UL (ref 3.9–12.7)
WBC #/AREA URNS HPF: 15 /HPF (ref 0–5)

## 2020-07-13 PROCEDURE — 25000003 PHARM REV CODE 250: Performed by: EMERGENCY MEDICINE

## 2020-07-13 PROCEDURE — 12000002 HC ACUTE/MED SURGE SEMI-PRIVATE ROOM

## 2020-07-13 PROCEDURE — 93010 ELECTROCARDIOGRAM REPORT: CPT | Mod: ,,, | Performed by: INTERNAL MEDICINE

## 2020-07-13 PROCEDURE — 63600175 PHARM REV CODE 636 W HCPCS: Performed by: INTERNAL MEDICINE

## 2020-07-13 PROCEDURE — 83880 ASSAY OF NATRIURETIC PEPTIDE: CPT

## 2020-07-13 PROCEDURE — 96374 THER/PROPH/DIAG INJ IV PUSH: CPT

## 2020-07-13 PROCEDURE — 85730 THROMBOPLASTIN TIME PARTIAL: CPT

## 2020-07-13 PROCEDURE — 84484 ASSAY OF TROPONIN QUANT: CPT

## 2020-07-13 PROCEDURE — 63600175 PHARM REV CODE 636 W HCPCS: Performed by: EMERGENCY MEDICINE

## 2020-07-13 PROCEDURE — 87088 URINE BACTERIA CULTURE: CPT

## 2020-07-13 PROCEDURE — 83735 ASSAY OF MAGNESIUM: CPT

## 2020-07-13 PROCEDURE — 85379 FIBRIN DEGRADATION QUANT: CPT

## 2020-07-13 PROCEDURE — 63700000 PHARM REV CODE 250 ALT 637 W/O HCPCS: Performed by: EMERGENCY MEDICINE

## 2020-07-13 PROCEDURE — 84443 ASSAY THYROID STIM HORMONE: CPT

## 2020-07-13 PROCEDURE — 80320 DRUG SCREEN QUANTALCOHOLS: CPT

## 2020-07-13 PROCEDURE — 83690 ASSAY OF LIPASE: CPT

## 2020-07-13 PROCEDURE — 80329 ANALGESICS NON-OPIOID 1 OR 2: CPT

## 2020-07-13 PROCEDURE — 81025 URINE PREGNANCY TEST: CPT | Performed by: EMERGENCY MEDICINE

## 2020-07-13 PROCEDURE — 93010 EKG 12-LEAD: ICD-10-PCS | Mod: ,,, | Performed by: INTERNAL MEDICINE

## 2020-07-13 PROCEDURE — 85025 COMPLETE CBC W/AUTO DIFF WBC: CPT

## 2020-07-13 PROCEDURE — 87086 URINE CULTURE/COLONY COUNT: CPT

## 2020-07-13 PROCEDURE — 85610 PROTHROMBIN TIME: CPT

## 2020-07-13 PROCEDURE — 87186 SC STD MICRODIL/AGAR DIL: CPT

## 2020-07-13 PROCEDURE — 81000 URINALYSIS NONAUTO W/SCOPE: CPT | Mod: 59

## 2020-07-13 PROCEDURE — 93005 ELECTROCARDIOGRAM TRACING: CPT

## 2020-07-13 PROCEDURE — 25000003 PHARM REV CODE 250: Performed by: INTERNAL MEDICINE

## 2020-07-13 PROCEDURE — 80053 COMPREHEN METABOLIC PANEL: CPT

## 2020-07-13 PROCEDURE — 99285 EMERGENCY DEPT VISIT HI MDM: CPT | Mod: 25

## 2020-07-13 PROCEDURE — 96361 HYDRATE IV INFUSION ADD-ON: CPT

## 2020-07-13 PROCEDURE — 87077 CULTURE AEROBIC IDENTIFY: CPT

## 2020-07-13 PROCEDURE — 51798 US URINE CAPACITY MEASURE: CPT

## 2020-07-13 PROCEDURE — 80307 DRUG TEST PRSMV CHEM ANLYZR: CPT

## 2020-07-13 PROCEDURE — U0002 COVID-19 LAB TEST NON-CDC: HCPCS

## 2020-07-13 RX ORDER — MULTIVITAMIN
1 TABLET ORAL DAILY
Status: DISCONTINUED | OUTPATIENT
Start: 2020-07-13 | End: 2020-07-14 | Stop reason: CLARIF

## 2020-07-13 RX ORDER — CYANOCOBALAMIN (VITAMIN B-12) 250 MCG
250 TABLET ORAL
Status: COMPLETED | OUTPATIENT
Start: 2020-07-13 | End: 2020-07-13

## 2020-07-13 RX ORDER — AMOXICILLIN 250 MG
1 CAPSULE ORAL 2 TIMES DAILY PRN
Status: DISCONTINUED | OUTPATIENT
Start: 2020-07-13 | End: 2020-07-16 | Stop reason: HOSPADM

## 2020-07-13 RX ORDER — DIAZEPAM 5 MG/1
10 TABLET ORAL EVERY 6 HOURS
Status: DISCONTINUED | OUTPATIENT
Start: 2020-07-13 | End: 2020-07-14

## 2020-07-13 RX ORDER — DEXTROSE MONOHYDRATE, SODIUM CHLORIDE, AND POTASSIUM CHLORIDE 50; 2.98; 4.5 G/1000ML; G/1000ML; G/1000ML
INJECTION, SOLUTION INTRAVENOUS CONTINUOUS
Status: DISCONTINUED | OUTPATIENT
Start: 2020-07-13 | End: 2020-07-16 | Stop reason: HOSPADM

## 2020-07-13 RX ORDER — PANTOPRAZOLE SODIUM 40 MG/1
40 TABLET, DELAYED RELEASE ORAL DAILY
Status: DISCONTINUED | OUTPATIENT
Start: 2020-07-13 | End: 2020-07-16 | Stop reason: HOSPADM

## 2020-07-13 RX ORDER — FOLIC ACID 1 MG/1
1 TABLET ORAL
Status: DISCONTINUED | OUTPATIENT
Start: 2020-07-13 | End: 2020-07-13

## 2020-07-13 RX ORDER — LORAZEPAM 2 MG/ML
2 INJECTION INTRAMUSCULAR EVERY 4 HOURS PRN
Status: DISCONTINUED | OUTPATIENT
Start: 2020-07-13 | End: 2020-07-16 | Stop reason: HOSPADM

## 2020-07-13 RX ORDER — LORAZEPAM 2 MG/ML
2 INJECTION INTRAMUSCULAR EVERY 4 HOURS PRN
Status: DISCONTINUED | OUTPATIENT
Start: 2020-07-13 | End: 2020-07-13

## 2020-07-13 RX ORDER — ACETAMINOPHEN 325 MG/1
650 TABLET ORAL EVERY 4 HOURS PRN
Status: DISCONTINUED | OUTPATIENT
Start: 2020-07-13 | End: 2020-07-16 | Stop reason: HOSPADM

## 2020-07-13 RX ORDER — MAGNESIUM SULFATE HEPTAHYDRATE 40 MG/ML
2 INJECTION, SOLUTION INTRAVENOUS ONCE
Status: COMPLETED | OUTPATIENT
Start: 2020-07-13 | End: 2020-07-13

## 2020-07-13 RX ORDER — MAGNESIUM SULFATE HEPTAHYDRATE 500 MG/ML
2 INJECTION, SOLUTION INTRAMUSCULAR; INTRAVENOUS ONCE
Status: DISCONTINUED | OUTPATIENT
Start: 2020-07-13 | End: 2020-07-13

## 2020-07-13 RX ORDER — ONDANSETRON 8 MG/1
8 TABLET, ORALLY DISINTEGRATING ORAL EVERY 8 HOURS PRN
Status: DISCONTINUED | OUTPATIENT
Start: 2020-07-13 | End: 2020-07-16 | Stop reason: HOSPADM

## 2020-07-13 RX ORDER — IBUPROFEN 200 MG
16 TABLET ORAL
Status: DISCONTINUED | OUTPATIENT
Start: 2020-07-13 | End: 2020-07-16 | Stop reason: HOSPADM

## 2020-07-13 RX ORDER — LANOLIN ALCOHOL/MO/W.PET/CERES
100 CREAM (GRAM) TOPICAL DAILY
Status: DISCONTINUED | OUTPATIENT
Start: 2020-07-14 | End: 2020-07-16 | Stop reason: HOSPADM

## 2020-07-13 RX ORDER — LORAZEPAM 2 MG/ML
1 INJECTION INTRAMUSCULAR
Status: COMPLETED | OUTPATIENT
Start: 2020-07-13 | End: 2020-07-13

## 2020-07-13 RX ORDER — FOLIC ACID 1 MG/1
1 TABLET ORAL DAILY
Status: DISCONTINUED | OUTPATIENT
Start: 2020-07-13 | End: 2020-07-16 | Stop reason: HOSPADM

## 2020-07-13 RX ORDER — IBUPROFEN 200 MG
24 TABLET ORAL
Status: DISCONTINUED | OUTPATIENT
Start: 2020-07-13 | End: 2020-07-16 | Stop reason: HOSPADM

## 2020-07-13 RX ORDER — POTASSIUM CHLORIDE 20 MEQ/15ML
40 SOLUTION ORAL
Status: COMPLETED | OUTPATIENT
Start: 2020-07-13 | End: 2020-07-13

## 2020-07-13 RX ORDER — LORAZEPAM 2 MG/ML
1 INJECTION INTRAMUSCULAR EVERY 4 HOURS PRN
Status: DISCONTINUED | OUTPATIENT
Start: 2020-07-13 | End: 2020-07-13

## 2020-07-13 RX ORDER — ACETAMINOPHEN 325 MG/1
650 TABLET ORAL EVERY 8 HOURS PRN
Status: DISCONTINUED | OUTPATIENT
Start: 2020-07-13 | End: 2020-07-16 | Stop reason: HOSPADM

## 2020-07-13 RX ORDER — GLUCAGON 1 MG
1 KIT INJECTION
Status: DISCONTINUED | OUTPATIENT
Start: 2020-07-13 | End: 2020-07-16 | Stop reason: HOSPADM

## 2020-07-13 RX ORDER — SODIUM CHLORIDE 0.9 % (FLUSH) 0.9 %
10 SYRINGE (ML) INJECTION
Status: DISCONTINUED | OUTPATIENT
Start: 2020-07-13 | End: 2020-07-16 | Stop reason: HOSPADM

## 2020-07-13 RX ORDER — DIAZEPAM 5 MG/1
10 TABLET ORAL 3 TIMES DAILY
Status: DISCONTINUED | OUTPATIENT
Start: 2020-07-13 | End: 2020-07-13

## 2020-07-13 RX ORDER — THIAMINE HYDROCHLORIDE 100 MG/ML
100 INJECTION, SOLUTION INTRAMUSCULAR; INTRAVENOUS
Status: COMPLETED | OUTPATIENT
Start: 2020-07-13 | End: 2020-07-13

## 2020-07-13 RX ORDER — MAGNESIUM SULFATE 1 G/100ML
1 INJECTION INTRAVENOUS
Status: DISCONTINUED | OUTPATIENT
Start: 2020-07-13 | End: 2020-07-13

## 2020-07-13 RX ADMIN — ONDANSETRON 8 MG: 8 TABLET, ORALLY DISINTEGRATING ORAL at 08:07

## 2020-07-13 RX ADMIN — THIAMINE HYDROCHLORIDE 100 MG: 100 INJECTION, SOLUTION INTRAMUSCULAR; INTRAVENOUS at 06:07

## 2020-07-13 RX ADMIN — FOLIC ACID 1 MG: 1 TABLET ORAL at 07:07

## 2020-07-13 RX ADMIN — MAGNESIUM SULFATE IN WATER 2 G: 40 INJECTION, SOLUTION INTRAVENOUS at 08:07

## 2020-07-13 RX ADMIN — LORAZEPAM 1 MG: 2 INJECTION INTRAMUSCULAR; INTRAVENOUS at 06:07

## 2020-07-13 RX ADMIN — SODIUM CHLORIDE 1000 ML: 0.9 INJECTION, SOLUTION INTRAVENOUS at 04:07

## 2020-07-13 RX ADMIN — SODIUM CHLORIDE, SODIUM LACTATE, POTASSIUM CHLORIDE, AND CALCIUM CHLORIDE 1000 ML: .6; .31; .03; .02 INJECTION, SOLUTION INTRAVENOUS at 06:07

## 2020-07-13 RX ADMIN — CYANOCOBALAMIN TAB 250 MCG 250 MCG: 250 TAB at 07:07

## 2020-07-13 RX ADMIN — PANTOPRAZOLE SODIUM 40 MG: 40 TABLET, DELAYED RELEASE ORAL at 07:07

## 2020-07-13 RX ADMIN — POTASSIUM CHLORIDE 40 MEQ: 20 SOLUTION ORAL at 06:07

## 2020-07-13 RX ADMIN — DIAZEPAM 10 MG: 5 TABLET ORAL at 10:07

## 2020-07-13 RX ADMIN — LORAZEPAM 1 MG: 2 INJECTION INTRAMUSCULAR; INTRAVENOUS at 04:07

## 2020-07-13 RX ADMIN — POTASSIUM CHLORIDE: 2 INJECTION, SOLUTION, CONCENTRATE INTRAVENOUS at 07:07

## 2020-07-13 NOTE — ED TRIAGE NOTES
"Patient reports hx of alcoholism. States she went on a drinking binge after 3 months without alcohol. States she stopped drinking on Sunday. Reports "I feel like I'm in DT's." Reports chest pain, tremors, loss of appetite. Drank 3 pints hard liquor between Thursday and Saturday. Placed on cardiac monitor.   "

## 2020-07-13 NOTE — ASSESSMENT & PLAN NOTE
Consult Psychiatry  MVI po Qday  Thiamine 100mg po qday  Folic acid 1mg po qday  UDS negative for other substances

## 2020-07-13 NOTE — ASSESSMENT & PLAN NOTE
Monitor for now, as likely from chronic ETOH binge drinking  Lipase level  Elevated AST as well, as expected from heavy ETOH use

## 2020-07-13 NOTE — SUBJECTIVE & OBJECTIVE
Past Medical History:   Diagnosis Date    Alcohol abuse     Bipolar 1 disorder        History reviewed. No pertinent surgical history.    Review of patient's allergies indicates:  No Known Allergies    No current facility-administered medications on file prior to encounter.      No current outpatient medications on file prior to encounter.     Family History     None        Tobacco Use    Smoking status: Current Every Day Smoker     Packs/day: 1.00     Types: Cigarettes   Substance and Sexual Activity    Alcohol use: Yes     Alcohol/week: 20.0 standard drinks     Types: 20 Shots of liquor per week    Drug use: No    Sexual activity: Not on file     Review of Systems   Constitutional: Positive for activity change, appetite change, chills, diaphoresis and fatigue. Negative for fever.   HENT: Negative for congestion.    Eyes: Positive for redness.   Respiratory: Positive for cough and wheezing. Negative for shortness of breath.    Cardiovascular: Positive for chest pain.   Gastrointestinal: Positive for abdominal pain, nausea and vomiting. Negative for constipation and diarrhea.   Endocrine: Positive for heat intolerance.   Genitourinary: Negative for dysuria.   Musculoskeletal: Positive for myalgias.   Allergic/Immunologic: Negative for immunocompromised state.   Neurological: Negative for dizziness and weakness.   Hematological: Bruises/bleeds easily.   Psychiatric/Behavioral: Positive for sleep disturbance. Negative for confusion and suicidal ideas. The patient is nervous/anxious.      Objective:     Vital Signs (Most Recent):  Temp: 99.7 °F (37.6 °C) (07/13/20 1945)  Pulse: (!) 121 (07/13/20 1935)  Resp: (!) 21 (07/13/20 1934)  BP: 99/62 (07/13/20 1935)  SpO2: 95 % (07/13/20 1939) Vital Signs (24h Range):  Temp:  [99.2 °F (37.3 °C)-99.7 °F (37.6 °C)] 99.7 °F (37.6 °C)  Pulse:  [117-121] 121  Resp:  [16-21] 21  SpO2:  [95 %-98 %] 95 %  BP: ()/(62-86) 99/62     Weight: 52 kg (114 lb 10.2 oz)  Body mass  index is 22.39 kg/m².    Physical Exam  Vitals signs and nursing note reviewed.   Constitutional:       General: She is not in acute distress.     Appearance: She is well-developed. She is diaphoretic.   HENT:      Head: Normocephalic and atraumatic.      Mouth/Throat:      Pharynx: No oropharyngeal exudate.   Eyes:      General: No scleral icterus.        Right eye: No discharge.         Left eye: No discharge.      Conjunctiva/sclera: Conjunctivae normal.      Pupils: Pupils are equal, round, and reactive to light.      Comments: Conjunctivae injected   Neck:      Musculoskeletal: Normal range of motion and neck supple.      Thyroid: No thyromegaly.      Vascular: No JVD.      Trachea: No tracheal deviation.   Cardiovascular:      Rate and Rhythm: Regular rhythm. Tachycardia present.      Heart sounds: Normal heart sounds. No murmur. No friction rub. No gallop.    Pulmonary:      Effort: Respiratory distress present.      Breath sounds: Normal breath sounds. No stridor. No wheezing or rales.      Comments: Few squeaks  Chest:      Chest wall: No tenderness.   Abdominal:      General: Bowel sounds are normal. There is no distension.      Palpations: Abdomen is soft. There is no mass.      Tenderness: There is abdominal tenderness. There is no guarding or rebound.      Comments: Mild epigastric tenderness   Musculoskeletal: Normal range of motion.         General: No tenderness.   Lymphadenopathy:      Cervical: No cervical adenopathy.   Skin:     General: Skin is warm.      Coloration: Skin is not pale.      Findings: No erythema or rash.      Comments: Tattoo left upper chest   Neurological:      Mental Status: She is alert and oriented to person, place, and time.      Cranial Nerves: No cranial nerve deficit.      Motor: No abnormal muscle tone.      Coordination: Coordination normal.      Deep Tendon Reflexes: Reflexes are normal and symmetric. Reflexes normal.   Psychiatric:         Behavior: Behavior normal.          Thought Content: Thought content normal.           CRANIAL NERVES     CN III, IV, VI   Pupils are equal, round, and reactive to light.       Significant Labs:   Recent Results (from the past 24 hour(s))   Comprehensive metabolic panel    Collection Time: 07/13/20  4:26 PM   Result Value Ref Range    Sodium 135 (L) 136 - 145 mmol/L    Potassium 2.8 (L) 3.5 - 5.1 mmol/L    Chloride 81 (L) 95 - 110 mmol/L    CO2 37 (H) 23 - 29 mmol/L    Glucose 94 70 - 110 mg/dL    BUN, Bld 20 6 - 20 mg/dL    Creatinine 0.7 0.5 - 1.4 mg/dL    Calcium 9.4 8.7 - 10.5 mg/dL    Total Protein 7.3 6.0 - 8.4 g/dL    Albumin 4.2 3.5 - 5.2 g/dL    Total Bilirubin 1.3 (H) 0.1 - 1.0 mg/dL    Alkaline Phosphatase 84 55 - 135 U/L    AST 50 (H) 10 - 40 U/L    ALT 28 10 - 44 U/L    Anion Gap 17 (H) 8 - 16 mmol/L    eGFR if African American >60 >60 mL/min/1.73 m^2    eGFR if non African American >60 >60 mL/min/1.73 m^2   CBC auto differential    Collection Time: 07/13/20  4:26 PM   Result Value Ref Range    WBC 9.46 3.90 - 12.70 K/uL    RBC 4.30 4.00 - 5.40 M/uL    Hemoglobin 14.4 12.0 - 16.0 g/dL    Hematocrit 41.4 37.0 - 48.5 %    Mean Corpuscular Volume 96 82 - 98 fL    Mean Corpuscular Hemoglobin 33.5 (H) 27.0 - 31.0 pg    Mean Corpuscular Hemoglobin Conc 34.8 32.0 - 36.0 g/dL    RDW 13.3 11.5 - 14.5 %    Platelets 161 150 - 350 K/uL    MPV 11.5 9.2 - 12.9 fL    Immature Granulocytes 0.3 0.0 - 0.5 %    Gran # (ANC) 6.8 1.8 - 7.7 K/uL    Immature Grans (Abs) 0.03 0.00 - 0.04 K/uL    Lymph # 1.9 1.0 - 4.8 K/uL    Mono # 0.7 0.3 - 1.0 K/uL    Eos # 0.0 0.0 - 0.5 K/uL    Baso # 0.02 0.00 - 0.20 K/uL    nRBC 0 0 /100 WBC    Gran% 72.2 38.0 - 73.0 %    Lymph% 20.2 18.0 - 48.0 %    Mono% 7.0 4.0 - 15.0 %    Eosinophil% 0.1 0.0 - 8.0 %    Basophil% 0.2 0.0 - 1.9 %    Differential Method Automated    Brain natriuretic peptide    Collection Time: 07/13/20  4:26 PM   Result Value Ref Range    BNP 80 0 - 99 pg/mL   Magnesium    Collection Time: 07/13/20   4:26 PM   Result Value Ref Range    Magnesium 1.5 (L) 1.6 - 2.6 mg/dL   Protime-INR    Collection Time: 07/13/20  4:26 PM   Result Value Ref Range    Prothrombin Time 10.9 9.0 - 12.5 sec    INR 1.0 0.8 - 1.2   APTT    Collection Time: 07/13/20  4:26 PM   Result Value Ref Range    aPTT 29.2 21.0 - 32.0 sec   TSH    Collection Time: 07/13/20  4:26 PM   Result Value Ref Range    TSH 1.673 0.400 - 4.000 uIU/mL   Troponin I    Collection Time: 07/13/20  4:26 PM   Result Value Ref Range    Troponin I <0.006 0.000 - 0.026 ng/mL   Ethanol    Collection Time: 07/13/20  4:26 PM   Result Value Ref Range    Alcohol, Medical, Serum <10 <10 mg/dL   Salicylate level    Collection Time: 07/13/20  4:26 PM   Result Value Ref Range    Salicylate Lvl <5.0 (L) 15.0 - 30.0 mg/dL   Acetaminophen level    Collection Time: 07/13/20  4:26 PM   Result Value Ref Range    Acetaminophen (Tylenol), Serum <3.0 (L) 10.0 - 20.0 ug/mL   D dimer, quantitative    Collection Time: 07/13/20  4:26 PM   Result Value Ref Range    D-Dimer 0.29 <0.50 mg/L FEU   Lipase    Collection Time: 07/13/20  4:26 PM   Result Value Ref Range    Lipase 22 4 - 60 U/L   Urinalysis, Reflex to Urine Culture Urine, Clean Catch    Collection Time: 07/13/20  4:52 PM    Specimen: Urine   Result Value Ref Range    Specimen UA Urine, Clean Catch     Color, UA Lizzie Yellow, Straw, Lizzie    Appearance, UA Hazy (A) Clear    pH, UA 8.0 5.0 - 8.0    Specific Gravity, UA 1.030 1.005 - 1.030    Protein, UA 3+ (A) Negative    Glucose, UA Negative Negative    Ketones, UA 1+ (A) Negative    Bilirubin (UA) 1+ (A) Negative    Occult Blood UA Negative Negative    Nitrite, UA Negative Negative    Urobilinogen, UA 4.0-6.0 (A) <2.0 EU/dL    Leukocytes, UA 2+ (A) Negative   Drug screen panel, emergency    Collection Time: 07/13/20  4:52 PM   Result Value Ref Range    Benzodiazepines Negative     Methadone metabolites Negative     Cocaine (Metab.) Negative     Opiate Scrn, Ur Negative      Barbiturate Screen, Ur Negative     Amphetamine Screen, Ur Negative     THC Negative     Phencyclidine Negative     Creatinine, Random Ur 267.9 15.0 - 325.0 mg/dL    Toxicology Information SEE COMMENT    Urinalysis Microscopic    Collection Time: 07/13/20  4:52 PM   Result Value Ref Range    RBC, UA 0 0 - 4 /hpf    WBC, UA 15 (H) 0 - 5 /hpf    Bacteria Moderate (A) None-Occ /hpf    Squam Epithel, UA 3 /hpf    Hyaline Casts, UA 5 (A) 0-1/lpf /lpf    Microscopic Comment SEE COMMENT    POCT urine pregnancy    Collection Time: 07/13/20  5:03 PM   Result Value Ref Range    POC Preg Test, Ur Negative Negative     Acceptable Yes          Significant Imaging:   None done in the ED    EKG:  Sinus tachy, rate 114  Inferior T wave abnormality  QTc of 642

## 2020-07-13 NOTE — ED PROVIDER NOTES
"Encounter Date: 7/13/2020    SCRIBE #1 NOTE: I, Gage Tiwari, am scribing for, and in the presence of,  Emanuel Herman MD. I have scribed the following portions of the note - Other sections scribed: HPI, ROS, PE.       History     Chief Complaint   Patient presents with    Alcohol Problem     pt comes in via EMS from home with c/o being unable to sleep, weakness, and heart palpitations after a 3 day ETOH "binge". EMS states upon arrival to the patient the pt HR was 138. pt denies daily ETOH use, just states "I had it in my mind to drink alot for 3 days"     This 45 y.o F with a hx of Alcohol abuse and Bipolar disorder presents to the ED via EMS c/o trouble sleepiing, fatigue, decreased appetite, chills, diaphoresis, nausea and dark urine x2 days. The pt states she has not eaten, consumed fluids or slept in x2 days. She reports a 3 day EtOH binge. She states, "I don't drink every day. I usually have binges with Gin." She has not consumed EtOH in x3 days. She also c/o chest pain since yesterday. She states her current symptoms are consistent with previous withdrawls. She has not taken her prescribed medications in x7 months. She denies fever, constipation, abdominal pain, SOB, cough, rhinorrhea, nasal congestion, dysuria, SI, HI, rash and any other associated symptoms. No prior tx.         Review of patient's allergies indicates:  No Known Allergies  Past Medical History:   Diagnosis Date    Alcohol abuse     Bipolar 1 disorder      History reviewed. No pertinent surgical history.  History reviewed. No pertinent family history.  Social History     Tobacco Use    Smoking status: Current Every Day Smoker     Packs/day: 1.00     Types: Cigarettes   Substance Use Topics    Alcohol use: Yes     Alcohol/week: 20.0 standard drinks     Types: 20 Shots of liquor per week    Drug use: No     Review of Systems   Constitutional: Positive for chills and fatigue.   Cardiovascular: Positive for chest pain.   Neurological: " Positive for weakness.       Physical Exam     Initial Vitals [07/13/20 1522]   BP Pulse Resp Temp SpO2   122/86 (!) 117 16 99.2 °F (37.3 °C) 98 %      MAP       --         Physical Exam    Nursing note and vitals reviewed.  Constitutional: Vital signs are normal. She appears well-developed and well-nourished.  Non-toxic appearance. No distress.   Lying Comfortably   HENT:   Head: Normocephalic and atraumatic.   Mouth/Throat: Oropharynx is clear and moist and mucous membranes are normal.   Tongue is dry   Eyes: Conjunctivae and EOM are normal. Pupils are equal, round, and reactive to light. Right conjunctiva is not injected. Left conjunctiva is not injected. No scleral icterus.   Neck: Normal range of motion and full passive range of motion without pain. Neck supple.   Cardiovascular: Regular rhythm, S1 normal, S2 normal and normal heart sounds. Tachycardia present.  Exam reveals no gallop.    No murmur heard.  Pulses:       Radial pulses are 2+ on the right side and 2+ on the left side.   HR ranging from 115-135   Pulmonary/Chest: Effort normal and breath sounds normal. No respiratory distress.   Abdominal: Soft. She exhibits no distension. There is no abdominal tenderness.   Musculoskeletal: Normal range of motion. No edema.      Comments: Good active ROM of all extremities. No lower extremity edema or cyanosis.    Neurological: No cranial nerve deficit or sensory deficit. Gait normal.   A&Ox4. Normal Speech. Mild tremors.   Skin: Skin is warm. No ecchymosis and no rash noted.   Delayed capillary refill.    Psychiatric: She has a normal mood and affect. Thought content normal.         ED Course   Procedures  Labs Reviewed   COMPREHENSIVE METABOLIC PANEL - Abnormal; Notable for the following components:       Result Value    Sodium 135 (*)     Potassium 2.8 (*)     Chloride 81 (*)     CO2 37 (*)     Total Bilirubin 1.3 (*)     AST 50 (*)     Anion Gap 17 (*)     All other components within normal limits   CBC W/  AUTO DIFFERENTIAL - Abnormal; Notable for the following components:    Mean Corpuscular Hemoglobin 33.5 (*)     All other components within normal limits   MAGNESIUM - Abnormal; Notable for the following components:    Magnesium 1.5 (*)     All other components within normal limits   URINALYSIS, REFLEX TO URINE CULTURE - Abnormal; Notable for the following components:    Appearance, UA Hazy (*)     Protein, UA 3+ (*)     Ketones, UA 1+ (*)     Bilirubin (UA) 1+ (*)     Urobilinogen, UA 4.0-6.0 (*)     Leukocytes, UA 2+ (*)     All other components within normal limits    Narrative:     Specimen Source->Urine   SALICYLATE LEVEL - Abnormal; Notable for the following components:    Salicylate Lvl <5.0 (*)     All other components within normal limits   ACETAMINOPHEN LEVEL - Abnormal; Notable for the following components:    Acetaminophen (Tylenol), Serum <3.0 (*)     All other components within normal limits   URINALYSIS MICROSCOPIC - Abnormal; Notable for the following components:    WBC, UA 15 (*)     Bacteria Moderate (*)     Hyaline Casts, UA 5 (*)     All other components within normal limits    Narrative:     Specimen Source->Urine   CULTURE, URINE   PROTIME-INR   APTT   TSH   TROPONIN I   DRUG SCREEN PANEL, URINE EMERGENCY    Narrative:     Specimen Source->Urine   ALCOHOL,MEDICAL (ETHANOL)   D DIMER, QUANTITATIVE   LIPASE   B-TYPE NATRIURETIC PEPTIDE   SARS-COV-2 RNA AMPLIFICATION, QUAL   TSH   POCT URINE PREGNANCY     EKG Readings: (Independently Interpreted)   EKG done 1627 showing sinus tachycardia with premature atrial complexes.  Wavy baseline.  QRS is 86.  QTC is prolonged at 629..  Normal axis QRS.  Abnormal EKG.    EKG done 1838 showing sinus tachycardia rate of 114.  No ST elevation.  Prolonged QTC.  Normal axis QRS.  Tachycardia has improved.  QTC still prolonged.       Imaging Results    None          Medical Decision Making:   Initial Assessment:   45-year-old female presenting secondary to alcohol  abuse tremors reported hallucinations.  Markedly tachycardic.  Prolonged QTC.  Giving IV fluids.  Patient is concerning to have major electrolyte abnormalities.  Patient was found to be hypokalemic and low Mag and chloride and bicarb abnormal.  Anion gap is elevated.  She was aggressively hydrated here in the emergency department.  Also given Mag and also p.o. potassium.  This is to replace her electrolytes.  Also given a couple doses of Ativan here to help out her pulse.  Patient is no longer tachycardic in the 130s and is more and the 115-125 range.  Patient is feeling better after interventions.  Due to electrolyte abnormalities with EKG abnormalities admitting patient further workup management.    Please put in 35 minutes of critical care due to patient having a high risk of cardiac failure.   Separate from teaching and exclusive of procedure and ekg time  Includes:  Time at bedside  Time reviewing test results  Time discussing case with staff  Time documenting the medical record  Time spent with family members  Time spent with consults  Management    Clinical Tests:   Lab Tests: Ordered and Reviewed  Radiological Study: Reviewed and Ordered  Medical Tests: Ordered and Reviewed                                 Clinical Impression:       ICD-10-CM ICD-9-CM   1. Alcohol withdrawal syndrome, with delirium  F10.231 291.0   2. Tachycardia  R00.0 785.0   3. Hypokalemia  E87.6 276.8   4. Hypomagnesemia  E83.42 275.2             ED Disposition Condition    Admit                I, Emanuel Herman, personally performed the services described in this documentation. All medical record entries made by the scribe were at my direction and in my presence. I have reviewed the chart and agree that the record reflects my personal performance and is accurate and complete.             Emanuel Herman MD  07/13/20 1927       Emanuel Herman MD  07/22/20 1129

## 2020-07-13 NOTE — ASSESSMENT & PLAN NOTE
She received 40 meq PO KCl in the ED  Will continue replacing with D51/2NS with 40 meq KCl at 100cc/hr  Follow levels  Telemetry

## 2020-07-14 PROBLEM — F10.980 ALCOHOL-INDUCED ANXIETY DISORDER: Status: ACTIVE | Noted: 2020-07-14

## 2020-07-14 PROBLEM — F10.982: Status: ACTIVE | Noted: 2020-07-14

## 2020-07-14 PROBLEM — F10.94 ALCOHOL-INDUCED MOOD DISORDER: Status: ACTIVE | Noted: 2020-07-14

## 2020-07-14 PROBLEM — F10.20 ALCOHOL USE DISORDER, SEVERE, DEPENDENCE: Status: ACTIVE | Noted: 2020-07-13

## 2020-07-14 PROBLEM — F31.9 BIPOLAR 1 DISORDER: Status: RESOLVED | Noted: 2020-07-13 | Resolved: 2020-07-14

## 2020-07-14 LAB
ALBUMIN SERPL BCP-MCNC: 2.9 G/DL (ref 3.5–5.2)
ALP SERPL-CCNC: 60 U/L (ref 55–135)
ALT SERPL W/O P-5'-P-CCNC: 25 U/L (ref 10–44)
ANION GAP SERPL CALC-SCNC: 6 MMOL/L (ref 8–16)
AST SERPL-CCNC: 82 U/L (ref 10–40)
BASOPHILS # BLD AUTO: 0.02 K/UL (ref 0–0.2)
BASOPHILS NFR BLD: 0.3 % (ref 0–1.9)
BILIRUB SERPL-MCNC: 0.9 MG/DL (ref 0.1–1)
BUN SERPL-MCNC: 15 MG/DL (ref 6–20)
CALCIUM SERPL-MCNC: 8.5 MG/DL (ref 8.7–10.5)
CHLORIDE SERPL-SCNC: 96 MMOL/L (ref 95–110)
CO2 SERPL-SCNC: 33 MMOL/L (ref 23–29)
CREAT SERPL-MCNC: 0.7 MG/DL (ref 0.5–1.4)
DIFFERENTIAL METHOD: ABNORMAL
EOSINOPHIL # BLD AUTO: 0.1 K/UL (ref 0–0.5)
EOSINOPHIL NFR BLD: 1.5 % (ref 0–8)
ERYTHROCYTE [DISTWIDTH] IN BLOOD BY AUTOMATED COUNT: 13.5 % (ref 11.5–14.5)
EST. GFR  (AFRICAN AMERICAN): >60 ML/MIN/1.73 M^2
EST. GFR  (NON AFRICAN AMERICAN): >60 ML/MIN/1.73 M^2
GLUCOSE SERPL-MCNC: 72 MG/DL (ref 70–110)
HCT VFR BLD AUTO: 33.7 % (ref 37–48.5)
HGB BLD-MCNC: 11.3 G/DL (ref 12–16)
IMM GRANULOCYTES # BLD AUTO: 0.03 K/UL (ref 0–0.04)
IMM GRANULOCYTES NFR BLD AUTO: 0.5 % (ref 0–0.5)
LYMPHOCYTES # BLD AUTO: 2.3 K/UL (ref 1–4.8)
LYMPHOCYTES NFR BLD: 34.3 % (ref 18–48)
MAGNESIUM SERPL-MCNC: 2 MG/DL (ref 1.6–2.6)
MCH RBC QN AUTO: 34.2 PG (ref 27–31)
MCHC RBC AUTO-ENTMCNC: 33.5 G/DL (ref 32–36)
MCV RBC AUTO: 102 FL (ref 82–98)
MONOCYTES # BLD AUTO: 0.3 K/UL (ref 0.3–1)
MONOCYTES NFR BLD: 5.1 % (ref 4–15)
NEUTROPHILS # BLD AUTO: 3.9 K/UL (ref 1.8–7.7)
NEUTROPHILS NFR BLD: 58.3 % (ref 38–73)
NRBC BLD-RTO: 0 /100 WBC
PHOSPHATE SERPL-MCNC: 3.1 MG/DL (ref 2.7–4.5)
PLATELET # BLD AUTO: 114 K/UL (ref 150–350)
PMV BLD AUTO: 11.7 FL (ref 9.2–12.9)
POTASSIUM SERPL-SCNC: 3.5 MMOL/L (ref 3.5–5.1)
PROT SERPL-MCNC: 5.1 G/DL (ref 6–8.4)
RBC # BLD AUTO: 3.3 M/UL (ref 4–5.4)
SODIUM SERPL-SCNC: 135 MMOL/L (ref 136–145)
WBC # BLD AUTO: 6.64 K/UL (ref 3.9–12.7)

## 2020-07-14 PROCEDURE — 83735 ASSAY OF MAGNESIUM: CPT

## 2020-07-14 PROCEDURE — 25000003 PHARM REV CODE 250: Performed by: INTERNAL MEDICINE

## 2020-07-14 PROCEDURE — 21400001 HC TELEMETRY ROOM

## 2020-07-14 PROCEDURE — 63600175 PHARM REV CODE 636 W HCPCS: Performed by: INTERNAL MEDICINE

## 2020-07-14 PROCEDURE — 90792 PSYCH DIAG EVAL W/MED SRVCS: CPT | Mod: ,,, | Performed by: PSYCHIATRY & NEUROLOGY

## 2020-07-14 PROCEDURE — 25000003 PHARM REV CODE 250: Performed by: HOSPITALIST

## 2020-07-14 PROCEDURE — S5010 5% DEXTROSE AND 0.45% SALINE: HCPCS | Performed by: INTERNAL MEDICINE

## 2020-07-14 PROCEDURE — 84100 ASSAY OF PHOSPHORUS: CPT

## 2020-07-14 PROCEDURE — 85025 COMPLETE CBC W/AUTO DIFF WBC: CPT

## 2020-07-14 PROCEDURE — 90792 PR PSYCHIATRIC DIAGNOSTIC EVALUATION W/MEDICAL SERVICES: ICD-10-PCS | Mod: ,,, | Performed by: PSYCHIATRY & NEUROLOGY

## 2020-07-14 PROCEDURE — 36415 COLL VENOUS BLD VENIPUNCTURE: CPT

## 2020-07-14 PROCEDURE — 80053 COMPREHEN METABOLIC PANEL: CPT

## 2020-07-14 RX ORDER — DIAZEPAM 5 MG/1
10 TABLET ORAL EVERY 8 HOURS
Status: DISCONTINUED | OUTPATIENT
Start: 2020-07-14 | End: 2020-07-15

## 2020-07-14 RX ADMIN — PANTOPRAZOLE SODIUM 40 MG: 40 TABLET, DELAYED RELEASE ORAL at 08:07

## 2020-07-14 RX ADMIN — DIAZEPAM 10 MG: 5 TABLET ORAL at 01:07

## 2020-07-14 RX ADMIN — POTASSIUM CHLORIDE: 2 INJECTION, SOLUTION, CONCENTRATE INTRAVENOUS at 06:07

## 2020-07-14 RX ADMIN — FOLIC ACID 1 MG: 1 TABLET ORAL at 08:07

## 2020-07-14 RX ADMIN — DIAZEPAM 10 MG: 5 TABLET ORAL at 09:07

## 2020-07-14 RX ADMIN — THERA TABS 1 TABLET: TAB at 08:07

## 2020-07-14 RX ADMIN — TRAZODONE HYDROCHLORIDE 25 MG: 50 TABLET ORAL at 01:07

## 2020-07-14 RX ADMIN — POTASSIUM CHLORIDE: 2 INJECTION, SOLUTION, CONCENTRATE INTRAVENOUS at 09:07

## 2020-07-14 RX ADMIN — DIAZEPAM 10 MG: 5 TABLET ORAL at 05:07

## 2020-07-14 RX ADMIN — Medication 100 MG: at 08:07

## 2020-07-14 NOTE — ASSESSMENT & PLAN NOTE
She received 40 meq PO KCl in the ED  Will continue replacing with D51/2NS with 40 meq KCl at 100cc/hr  Follow levels  Telemetry  Improve.
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
## 2020-07-14 NOTE — ED NOTES
Attempted to call report. Jeannine said the accepting nurse will call back as she is in a patient's room currently.

## 2020-07-14 NOTE — ASSESSMENT & PLAN NOTE
Depressive symptoms along with comorbid significant alcohol use disorder.  Does not meet criteria for major depression or bipolar disorder.  Does not need acute inpatient psychiatric hospitalization at this time.  Start fluoxetine 20 mg daily and trazodone  mg nightly.  She can follow up with the [facility] ([facility]) for ongoing mental health care and rehab.

## 2020-07-14 NOTE — PLAN OF CARE
Problem: Fall Injury Risk  Goal: Absence of Fall and Fall-Related Injury  Outcome: Ongoing, Progressing  Intervention: Identify and Manage Contributors to Fall Injury Risk  Flowsheets (Taken 7/14/2020 0604)  Self-Care Promotion:   BADL personal objects within reach   independence encouraged   BADL personal routines maintained  Medication Review/Management:   medications reviewed   high risk medications identified  Intervention: Promote Injury-Free Environment  Flowsheets (Taken 7/14/2020 0604)  Safety Promotion/Fall Prevention:   assistive device/personal item within reach   bed alarm set   bedside commode chair   Fall Risk reviewed with patient/family   high risk medications identified   instructed to call staff for mobility   toileting scheduled   medications reviewed   /camera at bedside   supervised activity   side rails raised x 2   room near unit station  Environmental Safety Modification:   assistive device/personal items within reach   clutter free environment maintained     Problem: Adult Inpatient Plan of Care  Goal: Plan of Care Review  Outcome: Ongoing, Progressing  Flowsheets (Taken 7/14/2020 0604)  Plan of Care Reviewed With: patient  Goal: Patient-Specific Goal (Individualization)  Outcome: Ongoing, Progressing     No acute event overnight. No fall or any new skin injury. Patient verbalized she feel better and able to take a nap after med given to her for insomnia. Patient able to go back to sleep between care. Denies any discomfort, no tremors, hallucination at this time. Will give report to morning shift.

## 2020-07-14 NOTE — SUBJECTIVE & OBJECTIVE
Patient History           Medical as of 7/14/2020     Past Medical History     Diagnosis Date Comments Source    Alcohol abuse -- -- Provider    History of psychiatric hospitalization -- -- Provider    Hx of psychiatric care -- -- Provider    Psychiatric problem -- -- Provider    Therapy -- -- Provider          Pertinent Negatives     Diagnosis Date Noted Comments Source    Suicide attempt 07/14/2020 -- Provider                  Surgical as of 7/14/2020    Past Surgical History: Patient provided no pertinent surgical history.           Family as of 7/14/2020     Problem Relation Name Age of Onset Comments Source    Alcohol abuse Mother -- -- -- Provider            Tobacco Use as of 7/14/2020     Smoking Status Smoking Start Date Smoking Quit Date Packs/Day Years Used    Current Every Day Smoker -- -- 1.00 --    Types Comments Smokeless Tobacco Status Smokeless Tobacco Quit Date Source     Cigarettes -- Never Used -- Provider            Alcohol Use as of 7/14/2020     Alcohol Use Drinks/Week Alcohol/Week Comments Source    Yes 20 Shots of liquor 20.0 standard drinks minimum half pint daily; withdrawal shakes/tremors; no seizures Provider    Frequency Typical Drinks Binge Drinking        -- -- --              Drug Use as of 7/14/2020     Drug Use Types Frequency Comments Source    No -- -- -- Provider            Sexual Activity as of 7/14/2020     Sexually Active Birth Control Partners Comments Source    -- -- -- -- Provider            Activities of Daily Living as of 7/14/2020     Activities of Daily Living Question Response Comments Source    Patient feels they ought to cut down on drinking/drug use Not Asked -- Provider    Patient annoyed by others criticizing their drinking/drug use Not Asked -- Provider    Patient has felt bad or guilty about drinking/drug use Not Asked -- Provider    Patient has had a drink/used drugs as an eye opener in the AM Not Asked -- Provider            Social Documentation as of  7/14/2020    None           Occupational as of 7/14/2020    None           Socioeconomic as of 7/14/2020     Marital Status Spouse Name Number of Children Years Education Education Level Preferred Language Ethnicity Race Source    Single -- 4 -- -- English /White White Provider    Financial Resource Strain Food Insecurity: Worry Food Insecurity: Inability Transportation Needs: Medical Transportation Needs: Non-medical    -- -- -- -- --            Pertinent History     Question Response Comments    Lives with friends friend in [place]    Place in Birth Order -- --    Lives in home --    Number of Siblings -- --    Raised by -- grew up in [place]    Legal Involvement -- --    Childhood Trauma -- --    Criminal History of -- --    Financial Status unemployed --    Highest Level of Education unfinished highschool --    Does patient have access to a firearm? No --     Service No --    Primary Leisure Activity -- --    Spirituality non-practicing --        Past Medical History:   Diagnosis Date    Alcohol abuse     History of psychiatric hospitalization     Hx of psychiatric care     Psychiatric problem     Therapy      History reviewed. No pertinent surgical history.  Family History     Problem Relation (Age of Onset)    Alcohol abuse Mother        Tobacco Use    Smoking status: Current Every Day Smoker     Packs/day: 1.00     Types: Cigarettes    Smokeless tobacco: Never Used   Substance and Sexual Activity    Alcohol use: Yes     Alcohol/week: 20.0 standard drinks     Types: 20 Shots of liquor per week     Comment: minimum half pint daily; withdrawal shakes/tremors; no seizures    Drug use: No    Sexual activity: Not on file     Review of patient's allergies indicates:  No Known Allergies    No current facility-administered medications on file prior to encounter.      No current outpatient medications on file prior to encounter.     Psychotherapeutics (From admission, onward)    Start      Stop Route Frequency Ordered    07/14/20 1400  diazePAM tablet 10 mg      -- Oral Every 8 hours 07/14/20 1004    07/13/20 2131  LORazepam injection 2 mg      -- IV Every 4 hours PRN 07/13/20 2132    07/13/20 1944  trazodone split tablet 25 mg      -- Oral Nightly PRN 07/13/20 1847        Review of Systems   Constitutional: Positive for activity change, appetite change and fatigue.   Respiratory: Negative for shortness of breath.    Cardiovascular: Negative for chest pain.   Gastrointestinal: Positive for nausea.   Musculoskeletal: Positive for back pain and myalgias.   Psychiatric/Behavioral: Positive for decreased concentration, dysphoric mood and sleep disturbance. Negative for suicidal ideas. The patient is nervous/anxious.      Strengths and Liabilities: Liability: Patient is defensive., Liability: Patient is dependent., Liability: Patient has no suport network., Liability: Patient lacks coping skills.    Objective:     Vital Signs (Most Recent):  Temp: 98.1 °F (36.7 °C) (07/14/20 1109)  Pulse: 89 (07/14/20 1109)  Resp: 16 (07/14/20 1109)  BP: 100/71 (07/14/20 1109)  SpO2: 98 % (07/14/20 1109) Vital Signs (24h Range):  Temp:  [97.8 °F (36.6 °C)-99.7 °F (37.6 °C)] 98.1 °F (36.7 °C)  Pulse:  [] 89  Resp:  [16-23] 16  SpO2:  [95 %-98 %] 98 %  BP: ()/(62-86) 100/71     Height: 5' (152.4 cm)  Weight: 49.5 kg (109 lb 2 oz)  Body mass index is 21.31 kg/m².      Intake/Output Summary (Last 24 hours) at 7/14/2020 1455  Last data filed at 7/13/2020 2306  Gross per 24 hour   Intake 2000 ml   Output --   Net 2000 ml       Physical Exam  Psychiatric:      Comments: EXAMINATION    CONSTITUTIONAL  General Appearance:  Hospital attire    MUSCULOSKELETAL  Muscle Strength and Tone:  Normal  Abnormal Involuntary Movements:  Mild tremor noted to both hands  Gait and Station:  Not observed    PSYCHIATRIC MENTAL STATUS EXAM   Level of Consciousness:  Awake and alert  Orientation:  Name, place, date, situation  Grooming:   Limited hospital setting, looks much older than stated age  Psychomotor Behavior:  Slowed  Speech:  Soft and somewhat delayed  Language:  No abnormality  Mood:  Depressed and anxious  Affect:  Blunted  Thought Process:  Linear  Associations:  Intact  Thought Content:  Denies suicidal/homicidal/psychosis  Memory:  Intact to recent and remote  Attention:  Mildly distracted  Fund of Knowledge:  Simple conversation  Insight:  Fair into alcohol use; limited into alcohol causing mental problems  Judgment:  Limited into returning to rehab, fair into going to outpatient psychiatric clinic           Significant Labs:   Last 24 Hours:   Recent Lab Results       07/14/20  0544   07/13/20  1920   07/13/20  1703   07/13/20  1652   07/13/20  1626        Benzodiazepines       Negative       Methadone metabolites       Negative       Phencyclidine       Negative       Acetaminophen (Tylenol), Serum         <3.0  Comment:  Toxic Levels:  Adults (4 hr post-ingestion).........>150 ug/mL  Adults (12 hr post-ingestion)........>40 ug/mL  Peds (2 hr post-ingestion, liquid)...>225 ug/mL       Albumin 2.9       4.2     Alcohol, Medical, Serum         <10     Alkaline Phosphatase 60       84     ALT 25       28     Amphetamine Screen, Ur       Negative       Anion Gap 6       17     Appearance, UA       Hazy       aPTT         29.2  Comment:  aPTT therapeutic range = 39-69 seconds     AST 82       50     Bacteria, UA       Moderate       Barbiturate Screen, Ur       Negative       Baso # 0.02       0.02     Basophil% 0.3       0.2     Bilirubin (UA)       1+  Comment:  Positive urine bilirubin is not confirmed. Correlate with   serum bilirubin and clinical presentation.         BILIRUBIN TOTAL 0.9  Comment:  For infants and newborns, interpretation of results should be based  on gestational age, weight and in agreement with clinical  observations.  Premature Infant recommended reference ranges:  Up to 24 hours.............<8.0 mg/dL  Up to  48 hours............<12.0 mg/dL  3-5 days..................<15.0 mg/dL  6-29 days.................<15.0 mg/dL         1.3  Comment:  For infants and newborns, interpretation of results should be based  on gestational age, weight and in agreement with clinical  observations.  Premature Infant recommended reference ranges:  Up to 24 hours.............<8.0 mg/dL  Up to 48 hours............<12.0 mg/dL  3-5 days..................<15.0 mg/dL  6-29 days.................<15.0 mg/dL       BNP         80  Comment:  Values of less than 100 pg/ml are consistent with non-CHF populations.     BUN, Bld 15       20     Calcium 8.5       9.4     Chloride 96       81     CO2 33       37     Cocaine (Metab.)       Negative       Color, UA       Lizzie       Creatinine 0.7       0.7     Creatinine, Random Ur       267.9  Comment:  The random urine reference ranges provided were established   for 24 hour urine collections.  No reference ranges exist for  random urine specimens.  Correlate clinically.         D-Dimer         0.29  Comment:  The quantitative D-dimer assay should be used as an aid in   the diagnosis of deep vein thrombosis and pulmonary embolism  in patients with the appropriate presentation and clinical  history. The upper limit of the reference interval and the clinical   cut off   point are identical. Causes of a positive (>0.50 mg/L FEU) D-Dimer   test  include, but are not limited to: DVT, PE, DIC, thrombolytic   therapy, anticoagulant therapy, recent surgery, trauma, or   pregnancy, disseminated malignancy, aortic aneurysm, cirrhosis,  and severe infection. False negative results may occur in   patients with distal DVT.       Differential Method Automated       Automated     eGFR if  >60       >60     eGFR if non  >60  Comment:  Calculation used to obtain the estimated glomerular filtration  rate (eGFR) is the CKD-EPI equation.          >60  Comment:  Calculation used to obtain the  estimated glomerular filtration  rate (eGFR) is the CKD-EPI equation.        Eos # 0.1       0.0     Eosinophil% 1.5       0.1     Glucose 72       94     Glucose, UA       Negative       Gran # (ANC) 3.9       6.8     Gran% 58.3       72.2     Hematocrit 33.7       41.4     Hemoglobin 11.3       14.4     Hyaline Casts, UA       5       Immature Grans (Abs) 0.03  Comment:  Mild elevation in immature granulocytes is non specific and   can be seen in a variety of conditions including stress response,   acute inflammation, trauma and pregnancy. Correlation with other   laboratory and clinical findings is essential.         0.03  Comment:  Mild elevation in immature granulocytes is non specific and   can be seen in a variety of conditions including stress response,   acute inflammation, trauma and pregnancy. Correlation with other   laboratory and clinical findings is essential.       Immature Granulocytes 0.5       0.3     INR         1.0  Comment:  Coumadin Therapy:  2.0 - 3.0 for INR for all indicators except mechanical heart valves  and antiphospholipid syndromes which should use 2.5 - 3.5.       Ketones, UA       1+       Leukocytes, UA       2+       Lipase         22     Lymph # 2.3       1.9     Lymph% 34.3       20.2     Magnesium 2.0       1.5     MCH 34.2       33.5     MCHC 33.5       34.8            96     Microscopic Comment       SEE COMMENT  Comment:  Other formed elements not mentioned in the report are not   present in the microscopic examination.          Mono # 0.3       0.7     Mono% 5.1       7.0     MPV 11.7       11.5     NITRITE UA       Negative       nRBC 0       0     Occult Blood UA       Negative       Opiate Scrn, Ur       Negative       pH, UA       8.0       Phosphorus 3.1             Platelets 114       161     Potassium 3.5       2.8     Preg Test, Ur     Negative         PROTEIN TOTAL 5.1       7.3     Protein, UA       3+  Comment:  Recommend a 24 hour urine protein or a urine    protein/creatinine ratio if globulin induced proteinuria is  clinically suspected.         Protime         10.9      Acceptable     Yes         RBC 3.30       4.30     RBC, UA       0       RDW 13.5       13.3     Salicylate Lvl         <5.0  Comment:  Toxic:  30.0 - 70.0 mg/dl  Lethal: >70.0 mg/dl       SARS-CoV-2 RNA, Amplification, Qual   Negative  Comment:  This test utilizes isothermal nucleic acid amplification   technology to detect the SARS-CoV-2 RdRp nucleic acid segment.   The analytical sensitivity (limit of detection) is 125 genome   equivalents/mL.   A POSITIVE result implies infection with the SARS-CoV-2 virus;  the patient is presumed to be contagious.    A NEGATIVE result means that SARS-CoV-2 nucleic acids are not  present above the limit of detection. A NEGATIVE result should be   treated as presumptive. It does not rule out the possibility of   COVID-19 and should not be the sole basis for treatment decisions.   If COVID-19 is strongly suspected based on clinical and exposure   history, re-testing using an alternate molecular assay should be   considered.   This test is only for use under the Food and Drug   Administration s Emergency Use Authorization (EUA).   Commercial kits are provided by [employer]   Performance characteristics of the EUA have been independently  verified by [facility] Department of  Pathology and Laboratory Medicine.   _________________________________________________________________  The [employer] NOW COVID-19 Letter of Authorization, along with the   authorized Fact Sheet for Healthcare Providers, the authorized Fact  Sheet for Patients, and authorized labeling are available on the FDA   website:  www.fda.gov/MedicalDevices/Safety/EmergencySituations/yvu757281.htm             Sodium 135       135     Specific [place], UA       1.030       Specimen UA       Urine, Clean Catch       Squam Epithel, UA       3       Marijuana (THC) Metabolite        Negative       Toxicology Information       SEE COMMENT  Comment:  This screen includes the following classes of drugs at the   listed cut-off:  Benzodiazepines                  200 ng/ml  Methadone                        300 ng/ml  Cocaine metabolite               300 ng/ml  Opiates                          300 ng/ml  Barbiturates                     200 ng/ml  Amphetamines                    1000 ng/ml  Marijuana metabs (THC)            50 ng/ml  Phencyclidine (PCP)               25 ng/ml  High concentrations of Diphenhydramine may cross-react with  Phencyclidine PCP screening immunoassay giving a false   positive result.  High concentrations of Methylenedioxymethamphetamine (MDMA aka  Ectasy) and other structurally similar compounds may cross-   react with the Amphetamine/Methamphetamine screening   immunoassay giving a false positive result.  A metabolite of the anti-HIV drug Sustiva () may cause  false positive results in the Marijuana metabolite (THC)   screening assay.  Note: This exception list includes only more common   interferants in toxicology screen testing.  Because of many   cross-reactantspositive results on toxicology drug screens   should be confirmed whenever results do not correlate with   clinical presentation.  This report is intended for use in clinical monitoring and  management of patients. It is not intended for use in   employment related drug testing.  Because of any cross-reactants, positive results on toxicology  drug screens should be confirmed whenever results do not  correlate with clinical presentation.  Presumptive positive results are unconfirmed and may be used   only for medical purposes.  Assay Intended Use: This assay provides only a preliminary analytical  test result. A more specific alternate chemical method must be used  to obtain a confirmed analytical result. Gas chromatography/mass  spectrometry (GS/MS)is the preferred confirmatory method.  Clinical  consideration and professional judgement should be applied to any   drug of abuse test result, particularly when preliminary results  are used.         Troponin I         <0.006  Comment:  The reference interval for Troponin I represents the 99th percentile   cutoff   for our facility and is consistent with 3rd generation assay   performance.       TSH         1.673     Urine Culture, Routine       COAGULASE-NEGATIVE STAPHYLOCOCCUS SPECIES  > 100,000 cfu/ml  Identification and susceptibility pending  [P]       UROBILINOGEN UA       4.0-6.0       WBC, UA       15       WBC 6.64       9.46                        All pertinent labs within the past 24 hours have been reviewed.    Significant Imaging: I have reviewed all pertinent imaging results/findings within the past 24 hours.
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
## 2020-07-14 NOTE — CONSULTS
[facility]  Psychiatry  Consult Note    Patient Name: Shama Cristobal  MRN: [id number]   Code Status: Full Code  Admission Date: 7/13/2020  Hospital Length of Stay: 1 days  Attending Physician: Merly Velez MD  Primary Care Provider: Billy Soares    Current Legal Status: N/A    Patient information was obtained from patient, chart review, nursing,  and ER records.   Inpatient consult to Psychiatry  Consult performed by: Misha Jauregui MD  Consult ordered by: JORGE Fragoso MD        Subjective:     Principal Problem:Alcohol withdrawal syndrome with complication    Chief Complaint:  Depression, anxiety, alcohol use     HPI: Patient Shama Cristobal presents to the hospital with complaint of wanting alcohol detox.  She has a history of drinking a minimum of a half a pt of whiskey daily but after a recent argument with her boyfriend, she drink 3 fifths of alcohol in 3 days.  Significant history of drinking alcohol since teens.  Multiple rehabs in the past and completed a 10 month inpatient rehab program.  No sobriety upon leaving any of her rehabs.  History of shakes and tremors and delirium with withdrawal but no seizure activity.  Psychiatric consult was placed for alcohol use as well as depression and anxiety.  Patient is easily engaged in conversation and is alert and oriented.  She says that she has a long psychiatric history and has been to an inpatient psychiatric holding facility because of suicidal statements in the past.  She has a long history of depression and anxiety and used to follow at [facility].  She recalls being on sertraline, hydroxyzine, trazodone.  She feels that the medications did not work for her which is why she return to drinking again.  She does not ever recall being on any medications to cut back on alcohol cravings.  She admits to being depressed with no motivation or energy.  Decreased appetite.  Trouble falling asleep  "and when she does has vivid dreams.  No suicidal ideations.  Loss of interest in things that she would normally like to do.  Admits to being an anxious person and worries a lot about things.  I have anxiety PTSD and need anxiety medicines most importantly."  Says that she has period of increased energy followed by depression and happens mostly when she drinks alcohol.  When she stops drinking alcohol, her mind play tricks on her.  When she stops drinking alcohol, she can see things moving around the room and hear voices of people talking to her.  This has gotten worse with age.  Stressors include her youngest child of 14 years living with her mother, relationship troubles with her boyfriend, financial problems, no income.  Can you help me get disability so I can have some money?    Hospital Course: No notes on file         Patient History           Medical as of 7/14/2020     Past Medical History     Diagnosis Date Comments Source    Alcohol abuse -- -- Provider    History of psychiatric hospitalization -- -- Provider    Hx of psychiatric care -- -- Provider    Psychiatric problem -- -- Provider    Therapy -- -- Provider          Pertinent Negatives     Diagnosis Date Noted Comments Source    Suicide attempt 07/14/2020 -- Provider                  Surgical as of 7/14/2020    Past Surgical History: Patient provided no pertinent surgical history.           Family as of 7/14/2020     Problem Relation Name Age of Onset Comments Source    Alcohol abuse Mother -- -- -- Provider            Tobacco Use as of 7/14/2020     Smoking Status Smoking Start Date Smoking Quit Date Packs/Day Years Used    Current Every Day Smoker -- -- 1.00 --    Types Comments Smokeless Tobacco Status Smokeless Tobacco Quit Date Source     Cigarettes -- Never Used -- Provider            Alcohol Use as of 7/14/2020     Alcohol Use Drinks/Week Alcohol/Week Comments Source    Yes 20 Shots of liquor 20.0 standard drinks minimum half pint daily; " withdrawal shakes/tremors; no seizures Provider    Frequency Typical Drinks Binge Drinking        -- -- --              Drug Use as of 7/14/2020     Drug Use Types Frequency Comments Source    No -- -- -- Provider            Sexual Activity as of 7/14/2020     Sexually Active Birth Control Partners Comments Source    -- -- -- -- Provider            Activities of Daily Living as of 7/14/2020     Activities of Daily Living Question Response Comments Source    Patient feels they ought to cut down on drinking/drug use Not Asked -- Provider    Patient annoyed by others criticizing their drinking/drug use Not Asked -- Provider    Patient has felt bad or guilty about drinking/drug use Not Asked -- Provider    Patient has had a drink/used drugs as an eye opener in the AM Not Asked -- Provider            Social Documentation as of 7/14/2020    None           Occupational as of 7/14/2020    None           Socioeconomic as of 7/14/2020     Marital Status Spouse Name Number of Children Years Education Education Level Preferred Language Ethnicity Race Source    Single -- 4 -- -- English /White White Provider    Financial Resource Strain Food Insecurity: Worry Food Insecurity: Inability Transportation Needs: Medical Transportation Needs: Non-medical    -- -- -- -- --            Pertinent History     Question Response Comments    Lives with friends friend in [place]    Place in Birth Order -- --    Lives in home --    Number of Siblings -- --    Raised by -- grew up in [place]    Legal Involvement -- --    Childhood Trauma -- --    Criminal History of -- --    Financial Status unemployed --    Highest Level of Education unfinished highschool --    Does patient have access to a firearm? No --     Service No --    Primary Leisure Activity -- --    Spirituality non-practicing --        Past Medical History:   Diagnosis Date    Alcohol abuse     History of psychiatric hospitalization     Hx of psychiatric care      Psychiatric problem     Therapy      History reviewed. No pertinent surgical history.  Family History     Problem Relation (Age of Onset)    Alcohol abuse Mother        Tobacco Use    Smoking status: Current Every Day Smoker     Packs/day: 1.00     Types: Cigarettes    Smokeless tobacco: Never Used   Substance and Sexual Activity    Alcohol use: Yes     Alcohol/week: 20.0 standard drinks     Types: 20 Shots of liquor per week     Comment: minimum half pint daily; withdrawal shakes/tremors; no seizures    Drug use: No    Sexual activity: Not on file     Review of patient's allergies indicates:  No Known Allergies    No current facility-administered medications on file prior to encounter.      No current outpatient medications on file prior to encounter.     Psychotherapeutics (From admission, onward)    Start     Stop Route Frequency Ordered    07/14/20 1400  diazePAM tablet 10 mg      -- Oral Every 8 hours 07/14/20 1004    07/13/20 2131  LORazepam injection 2 mg      -- IV Every 4 hours PRN 07/13/20 2132 07/13/20 1944  trazodone split tablet 25 mg      -- Oral Nightly PRN 07/13/20 1847        Review of Systems   Constitutional: Positive for activity change, appetite change and fatigue.   Respiratory: Negative for shortness of breath.    Cardiovascular: Negative for chest pain.   Gastrointestinal: Positive for nausea.   Musculoskeletal: Positive for back pain and myalgias.   Psychiatric/Behavioral: Positive for decreased concentration, dysphoric mood and sleep disturbance. Negative for suicidal ideas. The patient is nervous/anxious.      Strengths and Liabilities: Liability: Patient is defensive., Liability: Patient is dependent., Liability: Patient has no suport network., Liability: Patient lacks coping skills.    Objective:     Vital Signs (Most Recent):  Temp: 98.1 °F (36.7 °C) (07/14/20 1109)  Pulse: 89 (07/14/20 1109)  Resp: 16 (07/14/20 1109)  BP: 100/71 (07/14/20 1109)  SpO2: 98 % (07/14/20 1109)  Vital Signs (24h Range):  Temp:  [97.8 °F (36.6 °C)-99.7 °F (37.6 °C)] 98.1 °F (36.7 °C)  Pulse:  [] 89  Resp:  [16-23] 16  SpO2:  [95 %-98 %] 98 %  BP: ()/(62-86) 100/71     Height: 5' (152.4 cm)  Weight: 49.5 kg (109 lb 2 oz)  Body mass index is 21.31 kg/m².      Intake/Output Summary (Last 24 hours) at 7/14/2020 1455  Last data filed at 7/13/2020 2306  Gross per 24 hour   Intake 2000 ml   Output --   Net 2000 ml       Physical Exam  Psychiatric:      Comments: EXAMINATION    CONSTITUTIONAL  General Appearance:  Hospital attire    MUSCULOSKELETAL  Muscle Strength and Tone:  Normal  Abnormal Involuntary Movements:  Mild tremor noted to both hands  Gait and Station:  Not observed    PSYCHIATRIC MENTAL STATUS EXAM   Level of Consciousness:  Awake and alert  Orientation:  Name, place, date, situation  Grooming:  Limited hospital setting, looks much older than stated age  Psychomotor Behavior:  Slowed  Speech:  Soft and somewhat delayed  Language:  No abnormality  Mood:  Depressed and anxious  Affect:  Blunted  Thought Process:  Linear  Associations:  Intact  Thought Content:  Denies suicidal/homicidal/psychosis  Memory:  Intact to recent and remote  Attention:  Mildly distracted  Fund of Knowledge:  Simple conversation  Insight:  Fair into alcohol use; limited into alcohol causing mental problems  Judgment:  Limited into returning to rehab, fair into going to outpatient psychiatric clinic           Significant Labs:   Last 24 Hours:   Recent Lab Results       07/14/20  0544   07/13/20  1920   07/13/20  1703   07/13/20  1652   07/13/20  1626        Benzodiazepines       Negative       Methadone metabolites       Negative       Phencyclidine       Negative       Acetaminophen (Tylenol), Serum         <3.0  Comment:  Toxic Levels:  Adults (4 hr post-ingestion).........>150 ug/mL  Adults (12 hr post-ingestion)........>40 ug/mL  Peds (2 hr post-ingestion, liquid)...>225 ug/mL       Albumin 2.9       4.2      Alcohol, Medical, Serum         <10     Alkaline Phosphatase 60       84     ALT 25       28     Amphetamine Screen, Ur       Negative       Anion Gap 6       17     Appearance, UA       Hazy       aPTT         29.2  Comment:  aPTT therapeutic range = 39-69 seconds     AST 82       50     Bacteria, UA       Moderate       Barbiturate Screen, Ur       Negative       Baso # 0.02       0.02     Basophil% 0.3       0.2     Bilirubin (UA)       1+  Comment:  Positive urine bilirubin is not confirmed. Correlate with   serum bilirubin and clinical presentation.         BILIRUBIN TOTAL 0.9  Comment:  For infants and newborns, interpretation of results should be based  on gestational age, weight and in agreement with clinical  observations.  Premature Infant recommended reference ranges:  Up to 24 hours.............<8.0 mg/dL  Up to 48 hours............<12.0 mg/dL  3-5 days..................<15.0 mg/dL  6-29 days.................<15.0 mg/dL         1.3  Comment:  For infants and newborns, interpretation of results should be based  on gestational age, weight and in agreement with clinical  observations.  Premature Infant recommended reference ranges:  Up to 24 hours.............<8.0 mg/dL  Up to 48 hours............<12.0 mg/dL  3-5 days..................<15.0 mg/dL  6-29 days.................<15.0 mg/dL       BNP         80  Comment:  Values of less than 100 pg/ml are consistent with non-CHF populations.     BUN, Bld 15       20     Calcium 8.5       9.4     Chloride 96       81     CO2 33       37     Cocaine (Metab.)       Negative       Color, UA       Lizzie       Creatinine 0.7       0.7     Creatinine, Random Ur       267.9  Comment:  The random urine reference ranges provided were established   for 24 hour urine collections.  No reference ranges exist for  random urine specimens.  Correlate clinically.         D-Dimer         0.29  Comment:  The quantitative D-dimer assay should be used as an aid in   the diagnosis of  deep vein thrombosis and pulmonary embolism  in patients with the appropriate presentation and clinical  history. The upper limit of the reference interval and the clinical   cut off   point are identical. Causes of a positive (>0.50 mg/L FEU) D-Dimer   test  include, but are not limited to: DVT, PE, DIC, thrombolytic   therapy, anticoagulant therapy, recent surgery, trauma, or   pregnancy, disseminated malignancy, aortic aneurysm, cirrhosis,  and severe infection. False negative results may occur in   patients with distal DVT.       Differential Method Automated       Automated     eGFR if  >60       >60     eGFR if non  >60  Comment:  Calculation used to obtain the estimated glomerular filtration  rate (eGFR) is the CKD-EPI equation.          >60  Comment:  Calculation used to obtain the estimated glomerular filtration  rate (eGFR) is the CKD-EPI equation.        Eos # 0.1       0.0     Eosinophil% 1.5       0.1     Glucose 72       94     Glucose, UA       Negative       Gran # (ANC) 3.9       6.8     Gran% 58.3       72.2     Hematocrit 33.7       41.4     Hemoglobin 11.3       14.4     Hyaline Casts, UA       5       Immature Grans (Abs) 0.03  Comment:  Mild elevation in immature granulocytes is non specific and   can be seen in a variety of conditions including stress response,   acute inflammation, trauma and pregnancy. Correlation with other   laboratory and clinical findings is essential.         0.03  Comment:  Mild elevation in immature granulocytes is non specific and   can be seen in a variety of conditions including stress response,   acute inflammation, trauma and pregnancy. Correlation with other   laboratory and clinical findings is essential.       Immature Granulocytes 0.5       0.3     INR         1.0  Comment:  Coumadin Therapy:  2.0 - 3.0 for INR for all indicators except mechanical heart valves  and antiphospholipid syndromes which should use 2.5 - 3.5.        Ketones, UA       1+       Leukocytes, UA       2+       Lipase         22     Lymph # 2.3       1.9     Lymph% 34.3       20.2     Magnesium 2.0       1.5     MCH 34.2       33.5     MCHC 33.5       34.8            96     Microscopic Comment       SEE COMMENT  Comment:  Other formed elements not mentioned in the report are not   present in the microscopic examination.          Mono # 0.3       0.7     Mono% 5.1       7.0     MPV 11.7       11.5     NITRITE UA       Negative       nRBC 0       0     Occult Blood UA       Negative       Opiate Scrn, Ur       Negative       pH, UA       8.0       Phosphorus 3.1             Platelets 114       161     Potassium 3.5       2.8     Preg Test, Ur     Negative         PROTEIN TOTAL 5.1       7.3     Protein, UA       3+  Comment:  Recommend a 24 hour urine protein or a urine   protein/creatinine ratio if globulin induced proteinuria is  clinically suspected.         Protime         10.9      Acceptable     Yes         RBC 3.30       4.30     RBC, UA       0       RDW 13.5       13.3     Salicylate Lvl         <5.0  Comment:  Toxic:  30.0 - 70.0 mg/dl  Lethal: >70.0 mg/dl       SARS-CoV-2 RNA, Amplification, Qual   Negative  Comment:  This test utilizes isothermal nucleic acid amplification   technology to detect the SARS-CoV-2 RdRp nucleic acid segment.   The analytical sensitivity (limit of detection) is 125 genome   equivalents/mL.   A POSITIVE result implies infection with the SARS-CoV-2 virus;  the patient is presumed to be contagious.    A NEGATIVE result means that SARS-CoV-2 nucleic acids are not  present above the limit of detection. A NEGATIVE result should be   treated as presumptive. It does not rule out the possibility of   COVID-19 and should not be the sole basis for treatment decisions.   If COVID-19 is strongly suspected based on clinical and exposure   history, re-testing using an alternate molecular assay should be   considered.    This test is only for use under the Food and Drug   Administration s Emergency Use Authorization (EUA).   Commercial kits are provided by [employer].   Performance characteristics of the EUA have been independently  verified by [facility] Department of  Pathology and Laboratory Medicine.   _________________________________________________________________  The ID NOW COVID-19 Letter of Authorization, along with the   authorized Fact Sheet for Healthcare Providers, the authorized Fact  Sheet for Patients, and authorized labeling are available on the FDA   website:  www.fda.gov/MedicalDevices/Safety/EmergencySituations/feq672896.htm             Sodium 135       135     Specific [place], UA       1.030       Specimen UA       Urine, Clean Catch       Squam Epithel, UA       3       Marijuana (THC) Metabolite       Negative       Toxicology Information       SEE COMMENT  Comment:  This screen includes the following classes of drugs at the   listed cut-off:  Benzodiazepines                  200 ng/ml  Methadone                        300 ng/ml  Cocaine metabolite               300 ng/ml  Opiates                          300 ng/ml  Barbiturates                     200 ng/ml  Amphetamines                    1000 ng/ml  Marijuana metabs (THC)            50 ng/ml  Phencyclidine (PCP)               25 ng/ml  High concentrations of Diphenhydramine may cross-react with  Phencyclidine PCP screening immunoassay giving a false   positive result.  High concentrations of Methylenedioxymethamphetamine (MDMA aka  Ectasy) and other structurally similar compounds may cross-   react with the Amphetamine/Methamphetamine screening   immunoassay giving a false positive result.  A metabolite of the anti-HIV drug Sustiva () may cause  false positive results in the Marijuana metabolite (THC)   screening assay.  Note: This exception list includes only more common   interferants in toxicology screen testing.  Because  of many   cross-reactantspositive results on toxicology drug screens   should be confirmed whenever results do not correlate with   clinical presentation.  This report is intended for use in clinical monitoring and  management of patients. It is not intended for use in   employment related drug testing.  Because of any cross-reactants, positive results on toxicology  drug screens should be confirmed whenever results do not  correlate with clinical presentation.  Presumptive positive results are unconfirmed and may be used   only for medical purposes.  Assay Intended Use: This assay provides only a preliminary analytical  test result. A more specific alternate chemical method must be used  to obtain a confirmed analytical result. Gas chromatography/mass  spectrometry (GS/MS)is the preferred confirmatory method. Clinical  consideration and professional judgement should be applied to any   drug of abuse test result, particularly when preliminary results  are used.         Troponin I         <0.006  Comment:  The reference interval for Troponin I represents the 99th percentile   cutoff   for our facility and is consistent with 3rd generation assay   performance.       TSH         1.673     Urine Culture, Routine       COAGULASE-NEGATIVE STAPHYLOCOCCUS SPECIES  > 100,000 cfu/ml  Identification and susceptibility pending  [P]       UROBILINOGEN UA       4.0-6.0       WBC, UA       15       WBC 6.64       9.46                        All pertinent labs within the past 24 hours have been reviewed.    Significant Imaging: I have reviewed all pertinent imaging results/findings within the past 24 hours.    Assessment/Plan:     * Alcohol withdrawal syndrome with complication  Long chronic alcohol use history with withdrawal complications.  Currently managed with benzodiazepine taper.  Continue benzodiazepine taper.  Replenish multivitamin, thiamine, folic acid.    Alcohol induced insomnia  Insomnia symptoms along with comorbid  significant alcohol use disorder.  Insomnia likely related to CIARA up regulation with daily micro withdrawals.  Does not need acute inpatient psychiatric hospitalization at this time.  Start fluoxetine 20 mg daily and trazodone  mg nightly.  She can follow up with the [facility] ([facility]) for ongoing mental health care and rehab.    Alcohol-induced anxiety disorder  Anxiety symptoms along with comorbid significant alcohol use disorder.  Does not meet criteria for generalized anxiety disorder, OCD, PTSD, panic disorder.  Does not need acute inpatient psychiatric hospitalization at this time.  Start fluoxetine 20 mg daily and trazodone  mg nightly.  She can follow up with the [facility] ([facility]) for ongoing mental health care and rehab.    Alcohol-induced mood disorder  Depressive symptoms along with comorbid significant alcohol use disorder.  Does not meet criteria for major depression or bipolar disorder.  Does not need acute inpatient psychiatric hospitalization at this time.  Start fluoxetine 20 mg daily and trazodone  mg nightly.  She can follow up with the [facility] ([facility]) for ongoing mental health care and rehab.    Alcohol use disorder, severe, dependence  Severe alcohol use disorder for many years with questionable insight into returning to rehab.  No long enough period of sobriety to document a primary underlying psychiatric diagnosis of depression or anxiety.  For best results will need long-term inpatient rehab.         Total Time:  60 minutes      Misha Jauregui MD   Psychiatry  [facility]

## 2020-07-14 NOTE — CLINICAL REVIEW
Ms. Cristobal is beginning to show more signs of withdrawal and tachycardia:  I am increasing her Ativan to 2mg from 1mg, and her Valium to 10 mg po Q6 from Q8 hours.  If worsens, will place into the MICU.

## 2020-07-14 NOTE — ASSESSMENT & PLAN NOTE
Severe alcohol use disorder for many years with questionable insight into returning to rehab.  No long enough period of sobriety to document a primary underlying psychiatric diagnosis of depression or anxiety.  For best results will need long-term inpatient rehab.

## 2020-07-14 NOTE — HPI
"Patient Shama Cristobal presents to the hospital with complaint of wanting alcohol detox.  She has a history of drinking a minimum of a half a pt of whiskey daily but after a recent argument with her boyfriend, she drink 3 fifths of alcohol in 3 days.  Significant history of drinking alcohol since teens.  Multiple rehabs in the past and completed a 10 month inpatient rehab program.  No sobriety upon leaving any of her rehabs.  History of shakes and tremors and delirium with withdrawal but no seizure activity.  Psychiatric consult was placed for alcohol use as well as depression and anxiety.  Patient is easily engaged in conversation and is alert and oriented.  She says that she has a long psychiatric history and has been to an inpatient psychiatric holding facility because of suicidal statements in the past.  She has a long history of depression and anxiety and used to follow at [facility].  She recalls being on sertraline, hydroxyzine, trazodone.  She feels that the medications did not work for her which is why she return to drinking again.  She does not ever recall being on any medications to cut back on alcohol cravings.  She admits to being depressed with no motivation or energy.  Decreased appetite.  Trouble falling asleep and when she does has vivid dreams.  No suicidal ideations.  Loss of interest in things that she would normally like to do.  Admits to being an anxious person and worries a lot about things.  I have anxiety PTSD and need anxiety medicines most importantly."  Says that she has period of increased energy followed by depression and happens mostly when she drinks alcohol.  When she stops drinking alcohol, her mind play tricks on her.  When she stops drinking alcohol, she can see things moving around the room and hear voices of people talking to her.  This has gotten worse with age.  Stressors include her youngest child of 14 years living with her mother, relationship " troubles with her boyfriend, financial problems, no income.  Can you help me get disability so I can have some money?

## 2020-07-14 NOTE — NURSING
Received patient from ED to room via  Wheelchair. Patient accompanied by transport. Transferred patient to bed. Evaluated general patient appearance and condition. Admit assessment initiated. Tele monitoring initiated.IV fluids infusing well at left AC, intact No apparent distress noted at this time. Pt ambulated to bed with one assist, mild unsteadiness noted. Patient is oriented 4, but with on and off confusion noted. Bed alarm engaged call light within reach, bed in lowest position. Will continue to monitor.    Noted patient is dilutional and with hallucination at times. Verbalized there were people inside the room.
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
## 2020-07-14 NOTE — SUBJECTIVE & OBJECTIVE
Past Medical History:   Diagnosis Date    Alcohol abuse     Bipolar 1 disorder        History reviewed. No pertinent surgical history.    Review of patient's allergies indicates:  No Known Allergies    No current facility-administered medications on file prior to encounter.      No current outpatient medications on file prior to encounter.     Family History     None        Tobacco Use    Smoking status: Current Every Day Smoker     Packs/day: 1.00     Types: Cigarettes   Substance and Sexual Activity    Alcohol use: Yes     Alcohol/week: 20.0 standard drinks     Types: 20 Shots of liquor per week    Drug use: No    Sexual activity: Not on file     Review of Systems   Constitutional: Positive for activity change, appetite change, chills and fatigue. Negative for diaphoresis and fever.   HENT: Negative for congestion.    Eyes: Positive for redness.   Respiratory: Positive for cough. Negative for shortness of breath and wheezing.    Cardiovascular: Negative for chest pain.   Gastrointestinal: Negative for abdominal pain, constipation, diarrhea, nausea and vomiting.   Endocrine: Positive for heat intolerance.   Genitourinary: Negative for dysuria.   Musculoskeletal: Positive for myalgias.   Allergic/Immunologic: Negative for immunocompromised state.   Neurological: Negative for dizziness and weakness.   Hematological: Bruises/bleeds easily.   Psychiatric/Behavioral: Positive for sleep disturbance. Negative for confusion and suicidal ideas. The patient is not nervous/anxious.      Objective:     Vital Signs (Most Recent):  Temp: 97.8 °F (36.6 °C) (07/14/20 0740)  Pulse: 92 (07/14/20 0740)  Resp: 16 (07/14/20 0740)  BP: 114/80 (07/14/20 0740)  SpO2: 98 % (07/14/20 0740) Vital Signs (24h Range):  Temp:  [97.8 °F (36.6 °C)-99.7 °F (37.6 °C)] 97.8 °F (36.6 °C)  Pulse:  [] 92  Resp:  [16-23] 16  SpO2:  [95 %-98 %] 98 %  BP: ()/(62-86) 114/80     Weight: 49.5 kg (109 lb 2 oz)  Body mass index is 21.31  kg/m².    Physical Exam  Vitals signs and nursing note reviewed.   Constitutional:       General: She is not in acute distress.     Appearance: She is well-developed. She is diaphoretic.   HENT:      Head: Normocephalic and atraumatic.      Mouth/Throat:      Pharynx: No oropharyngeal exudate.   Eyes:      General: No scleral icterus.        Right eye: No discharge.         Left eye: No discharge.      Conjunctiva/sclera: Conjunctivae normal.      Pupils: Pupils are equal, round, and reactive to light.      Comments: Conjunctivae injected   Neck:      Musculoskeletal: Normal range of motion and neck supple.      Thyroid: No thyromegaly.      Vascular: No JVD.      Trachea: No tracheal deviation.   Cardiovascular:      Rate and Rhythm: Regular rhythm. Tachycardia present.      Heart sounds: Normal heart sounds. No murmur. No friction rub. No gallop.    Pulmonary:      Effort: Respiratory distress present.      Breath sounds: Normal breath sounds. No stridor. No wheezing or rales.      Comments: Few squeaks  Chest:      Chest wall: No tenderness.   Abdominal:      General: Bowel sounds are normal. There is no distension.      Palpations: Abdomen is soft. There is no mass.      Tenderness: There is abdominal tenderness. There is no guarding or rebound.      Comments: Mild epigastric tenderness   Musculoskeletal: Normal range of motion.         General: No tenderness.   Lymphadenopathy:      Cervical: No cervical adenopathy.   Skin:     General: Skin is warm.      Coloration: Skin is not pale.      Findings: No erythema or rash.      Comments: Tattoo left upper chest   Neurological:      Mental Status: She is alert and oriented to person, place, and time.      Cranial Nerves: No cranial nerve deficit.      Motor: No abnormal muscle tone.      Coordination: Coordination normal.      Deep Tendon Reflexes: Reflexes are normal and symmetric. Reflexes normal.   Psychiatric:         Behavior: Behavior normal.         Thought  Content: Thought content normal.           CRANIAL NERVES     CN III, IV, VI   Pupils are equal, round, and reactive to light.       Significant Labs:   Recent Results (from the past 24 hour(s))   Comprehensive metabolic panel    Collection Time: 07/13/20  4:26 PM   Result Value Ref Range    Sodium 135 (L) 136 - 145 mmol/L    Potassium 2.8 (L) 3.5 - 5.1 mmol/L    Chloride 81 (L) 95 - 110 mmol/L    CO2 37 (H) 23 - 29 mmol/L    Glucose 94 70 - 110 mg/dL    BUN, Bld 20 6 - 20 mg/dL    Creatinine 0.7 0.5 - 1.4 mg/dL    Calcium 9.4 8.7 - 10.5 mg/dL    Total Protein 7.3 6.0 - 8.4 g/dL    Albumin 4.2 3.5 - 5.2 g/dL    Total Bilirubin 1.3 (H) 0.1 - 1.0 mg/dL    Alkaline Phosphatase 84 55 - 135 U/L    AST 50 (H) 10 - 40 U/L    ALT 28 10 - 44 U/L    Anion Gap 17 (H) 8 - 16 mmol/L    eGFR if African American >60 >60 mL/min/1.73 m^2    eGFR if non African American >60 >60 mL/min/1.73 m^2   CBC auto differential    Collection Time: 07/13/20  4:26 PM   Result Value Ref Range    WBC 9.46 3.90 - 12.70 K/uL    RBC 4.30 4.00 - 5.40 M/uL    Hemoglobin 14.4 12.0 - 16.0 g/dL    Hematocrit 41.4 37.0 - 48.5 %    Mean Corpuscular Volume 96 82 - 98 fL    Mean Corpuscular Hemoglobin 33.5 (H) 27.0 - 31.0 pg    Mean Corpuscular Hemoglobin Conc 34.8 32.0 - 36.0 g/dL    RDW 13.3 11.5 - 14.5 %    Platelets 161 150 - 350 K/uL    MPV 11.5 9.2 - 12.9 fL    Immature Granulocytes 0.3 0.0 - 0.5 %    Gran # (ANC) 6.8 1.8 - 7.7 K/uL    Immature Grans (Abs) 0.03 0.00 - 0.04 K/uL    Lymph # 1.9 1.0 - 4.8 K/uL    Mono # 0.7 0.3 - 1.0 K/uL    Eos # 0.0 0.0 - 0.5 K/uL    Baso # 0.02 0.00 - 0.20 K/uL    nRBC 0 0 /100 WBC    Gran% 72.2 38.0 - 73.0 %    Lymph% 20.2 18.0 - 48.0 %    Mono% 7.0 4.0 - 15.0 %    Eosinophil% 0.1 0.0 - 8.0 %    Basophil% 0.2 0.0 - 1.9 %    Differential Method Automated    Brain natriuretic peptide    Collection Time: 07/13/20  4:26 PM   Result Value Ref Range    BNP 80 0 - 99 pg/mL   Magnesium    Collection Time: 07/13/20  4:26 PM    Result Value Ref Range    Magnesium 1.5 (L) 1.6 - 2.6 mg/dL   Protime-INR    Collection Time: 07/13/20  4:26 PM   Result Value Ref Range    Prothrombin Time 10.9 9.0 - 12.5 sec    INR 1.0 0.8 - 1.2   APTT    Collection Time: 07/13/20  4:26 PM   Result Value Ref Range    aPTT 29.2 21.0 - 32.0 sec   TSH    Collection Time: 07/13/20  4:26 PM   Result Value Ref Range    TSH 1.673 0.400 - 4.000 uIU/mL   Troponin I    Collection Time: 07/13/20  4:26 PM   Result Value Ref Range    Troponin I <0.006 0.000 - 0.026 ng/mL   Ethanol    Collection Time: 07/13/20  4:26 PM   Result Value Ref Range    Alcohol, Medical, Serum <10 <10 mg/dL   Salicylate level    Collection Time: 07/13/20  4:26 PM   Result Value Ref Range    Salicylate Lvl <5.0 (L) 15.0 - 30.0 mg/dL   Acetaminophen level    Collection Time: 07/13/20  4:26 PM   Result Value Ref Range    Acetaminophen (Tylenol), Serum <3.0 (L) 10.0 - 20.0 ug/mL   D dimer, quantitative    Collection Time: 07/13/20  4:26 PM   Result Value Ref Range    D-Dimer 0.29 <0.50 mg/L FEU   Lipase    Collection Time: 07/13/20  4:26 PM   Result Value Ref Range    Lipase 22 4 - 60 U/L   Urinalysis, Reflex to Urine Culture Urine, Clean Catch    Collection Time: 07/13/20  4:52 PM    Specimen: Urine   Result Value Ref Range    Specimen UA Urine, Clean Catch     Color, UA Lizzie Yellow, Straw, Lizzie    Appearance, UA Hazy (A) Clear    pH, UA 8.0 5.0 - 8.0    Specific Gravity, UA 1.030 1.005 - 1.030    Protein, UA 3+ (A) Negative    Glucose, UA Negative Negative    Ketones, UA 1+ (A) Negative    Bilirubin (UA) 1+ (A) Negative    Occult Blood UA Negative Negative    Nitrite, UA Negative Negative    Urobilinogen, UA 4.0-6.0 (A) <2.0 EU/dL    Leukocytes, UA 2+ (A) Negative   Drug screen panel, emergency    Collection Time: 07/13/20  4:52 PM   Result Value Ref Range    Benzodiazepines Negative     Methadone metabolites Negative     Cocaine (Metab.) Negative     Opiate Scrn, Ur Negative     Barbiturate  Screen, Ur Negative     Amphetamine Screen, Ur Negative     THC Negative     Phencyclidine Negative     Creatinine, Random Ur 267.9 15.0 - 325.0 mg/dL    Toxicology Information SEE COMMENT    Urinalysis Microscopic    Collection Time: 07/13/20  4:52 PM   Result Value Ref Range    RBC, UA 0 0 - 4 /hpf    WBC, UA 15 (H) 0 - 5 /hpf    Bacteria Moderate (A) None-Occ /hpf    Squam Epithel, UA 3 /hpf    Hyaline Casts, UA 5 (A) 0-1/lpf /lpf    Microscopic Comment SEE COMMENT    POCT urine pregnancy    Collection Time: 07/13/20  5:03 PM   Result Value Ref Range    POC Preg Test, Ur Negative Negative     Acceptable Yes    COVID-19 Rapid Screening    Collection Time: 07/13/20  7:20 PM   Result Value Ref Range    SARS-CoV-2 RNA, Amplification, Qual Negative Negative   Comprehensive Metabolic Panel (CMP)    Collection Time: 07/14/20  5:44 AM   Result Value Ref Range    Sodium 135 (L) 136 - 145 mmol/L    Potassium 3.5 3.5 - 5.1 mmol/L    Chloride 96 95 - 110 mmol/L    CO2 33 (H) 23 - 29 mmol/L    Glucose 72 70 - 110 mg/dL    BUN, Bld 15 6 - 20 mg/dL    Creatinine 0.7 0.5 - 1.4 mg/dL    Calcium 8.5 (L) 8.7 - 10.5 mg/dL    Total Protein 5.1 (L) 6.0 - 8.4 g/dL    Albumin 2.9 (L) 3.5 - 5.2 g/dL    Total Bilirubin 0.9 0.1 - 1.0 mg/dL    Alkaline Phosphatase 60 55 - 135 U/L    AST 82 (H) 10 - 40 U/L    ALT 25 10 - 44 U/L    Anion Gap 6 (L) 8 - 16 mmol/L    eGFR if African American >60 >60 mL/min/1.73 m^2    eGFR if non African American >60 >60 mL/min/1.73 m^2   Magnesium    Collection Time: 07/14/20  5:44 AM   Result Value Ref Range    Magnesium 2.0 1.6 - 2.6 mg/dL   Phosphorus    Collection Time: 07/14/20  5:44 AM   Result Value Ref Range    Phosphorus 3.1 2.7 - 4.5 mg/dL   CBC with Automated Differential    Collection Time: 07/14/20  5:44 AM   Result Value Ref Range    WBC 6.64 3.90 - 12.70 K/uL    RBC 3.30 (L) 4.00 - 5.40 M/uL    Hemoglobin 11.3 (L) 12.0 - 16.0 g/dL    Hematocrit 33.7 (L) 37.0 - 48.5 %    Mean  Corpuscular Volume 102 (H) 82 - 98 fL    Mean Corpuscular Hemoglobin 34.2 (H) 27.0 - 31.0 pg    Mean Corpuscular Hemoglobin Conc 33.5 32.0 - 36.0 g/dL    RDW 13.5 11.5 - 14.5 %    Platelets 114 (L) 150 - 350 K/uL    MPV 11.7 9.2 - 12.9 fL    Immature Granulocytes 0.5 0.0 - 0.5 %    Gran # (ANC) 3.9 1.8 - 7.7 K/uL    Immature Grans (Abs) 0.03 0.00 - 0.04 K/uL    Lymph # 2.3 1.0 - 4.8 K/uL    Mono # 0.3 0.3 - 1.0 K/uL    Eos # 0.1 0.0 - 0.5 K/uL    Baso # 0.02 0.00 - 0.20 K/uL    nRBC 0 0 /100 WBC    Gran% 58.3 38.0 - 73.0 %    Lymph% 34.3 18.0 - 48.0 %    Mono% 5.1 4.0 - 15.0 %    Eosinophil% 1.5 0.0 - 8.0 %    Basophil% 0.3 0.0 - 1.9 %    Differential Method Automated          Significant Imaging:   None done in the ED    EKG:  Sinus tachy, rate 114  Inferior T wave abnormality  QTc of 642

## 2020-07-14 NOTE — ASSESSMENT & PLAN NOTE
Insomnia symptoms along with comorbid significant alcohol use disorder.  Insomnia likely related to CIARA up regulation with daily micro withdrawals.  Does not need acute inpatient psychiatric hospitalization at this time.  Start fluoxetine 20 mg daily and trazodone  mg nightly.  She can follow up with the [facility] ([facility]) for ongoing mental health care and rehab.

## 2020-07-14 NOTE — PLAN OF CARE
07/14/20 1615   Discharge Assessment   Assessment Type Discharge Planning Assessment   Confirmed/corrected address and phone number on facesheet? Yes   Assessment information obtained from? Patient   Expected Length of Stay (days) 2   Communicated expected length of stay with patient/caregiver yes   Prior to hospitilization cognitive status: Alert/Oriented   Prior to hospitalization functional status: Independent   Current cognitive status: Alert/Oriented   Current Functional Status: Independent   Facility Arrived From: Home   Lives With alone   Able to Return to Prior Arrangements yes   Is patient able to care for self after discharge? Unable to determine at this time (comments)   Who are your caregiver(s) and their phone number(s)? Pt's mother: Afshan [phone]   Patient's perception of discharge disposition home or selfcare   Readmission Within the Last 30 Days no previous admission in last 30 days   Patient currently being followed by outpatient case management? No   Patient currently receives any other outside agency services? No   Equipment Currently Used at Home none   Do you have any problems affording any of your prescribed medications? No   Is the patient taking medications as prescribed? yes   Does the patient have transportation home? Yes   Transportation Anticipated family or friend will provide   Dialysis Name and Scheduled days N/A   Does the patient receive services at the [facility]? No   Discharge Plan A Home   DME Needed Upon Discharge  none   Patient/Family in Agreement with Plan yes       [id number] - [place] [address]ANKS  [address] [facility] [place] [address]  Phone: [phone] Fax: [phone]    KATHRYN visited pt at bedside to complete Discharge Planning Assessment. During assessment, pt stated that she has struggled with alcohol abuse for years and has attempted rehab twice before. SW offered to provide pt with rehab  resources, but pt declined. Pt reports no HME and help at home will be from pt's significant other and pt's mother.

## 2020-07-14 NOTE — ASSESSMENT & PLAN NOTE
Long chronic alcohol use history with withdrawal complications.  Currently managed with benzodiazepine taper.  Continue benzodiazepine taper.  Replenish multivitamin, thiamine, folic acid.

## 2020-07-14 NOTE — ASSESSMENT & PLAN NOTE
Patient is unmotivated to quit  Offered Nicotine patch, which was refused  Tobacco cessation counseling

## 2020-07-14 NOTE — PLAN OF CARE
Report from Luz Marina LEBRON. Noted patient in bed resting with eyes closed. No noted pain or dyspnea. Bed in lowest position, bed alarm on, AVASYS camera at bedside, and call light within reach. Board updated. Assessments per flow sheets. Will continue to monitor.      Problem: Adult Inpatient Plan of Care  Goal: Plan of Care Review  Outcome: Ongoing, Progressing     Problem: Fall Injury Risk  Goal: Absence of Fall and Fall-Related Injury  Outcome: Ongoing, Progressing

## 2020-07-14 NOTE — ASSESSMENT & PLAN NOTE
Started on Valium 10mg PO TID and Ativan 1 mg iv Q4 hours prn signs of continued ETOH withdrawal.  Place on telemetry with neuro checks q4 hours  Delirium precautions  Zofran and Phenergan for nausea,    she is admitted for alcohol withdraw,on scheduled valium,IVF,vitamins,appera to be improving,weam of valium,alert time 3.

## 2020-07-14 NOTE — ASSESSMENT & PLAN NOTE
Anxiety symptoms along with comorbid significant alcohol use disorder.  Does not meet criteria for generalized anxiety disorder, OCD, PTSD, panic disorder.  Does not need acute inpatient psychiatric hospitalization at this time.  Start fluoxetine 20 mg daily and trazodone  mg nightly.  She can follow up with the [facility] ([facility]) for ongoing mental health care and rehab.

## 2020-07-15 LAB
ALBUMIN SERPL BCP-MCNC: 2.7 G/DL (ref 3.5–5.2)
ALP SERPL-CCNC: 62 U/L (ref 55–135)
ALT SERPL W/O P-5'-P-CCNC: 60 U/L (ref 10–44)
ANION GAP SERPL CALC-SCNC: 4 MMOL/L (ref 8–16)
AST SERPL-CCNC: 171 U/L (ref 10–40)
BACTERIA UR CULT: ABNORMAL
BASOPHILS # BLD AUTO: 0.07 K/UL (ref 0–0.2)
BASOPHILS NFR BLD: 0.9 % (ref 0–1.9)
BILIRUB SERPL-MCNC: 0.6 MG/DL (ref 0.1–1)
BUN SERPL-MCNC: 15 MG/DL (ref 6–20)
CALCIUM SERPL-MCNC: 8.5 MG/DL (ref 8.7–10.5)
CHLORIDE SERPL-SCNC: 103 MMOL/L (ref 95–110)
CO2 SERPL-SCNC: 28 MMOL/L (ref 23–29)
CREAT SERPL-MCNC: 0.5 MG/DL (ref 0.5–1.4)
DIFFERENTIAL METHOD: ABNORMAL
EOSINOPHIL # BLD AUTO: 0.3 K/UL (ref 0–0.5)
EOSINOPHIL NFR BLD: 4.3 % (ref 0–8)
ERYTHROCYTE [DISTWIDTH] IN BLOOD BY AUTOMATED COUNT: 13.2 % (ref 11.5–14.5)
EST. GFR  (AFRICAN AMERICAN): >60 ML/MIN/1.73 M^2
EST. GFR  (NON AFRICAN AMERICAN): >60 ML/MIN/1.73 M^2
GLUCOSE SERPL-MCNC: 81 MG/DL (ref 70–110)
HCT VFR BLD AUTO: 34.1 % (ref 37–48.5)
HGB BLD-MCNC: 11.3 G/DL (ref 12–16)
IMM GRANULOCYTES # BLD AUTO: 0.02 K/UL (ref 0–0.04)
IMM GRANULOCYTES NFR BLD AUTO: 0.3 % (ref 0–0.5)
LYMPHOCYTES # BLD AUTO: 1.5 K/UL (ref 1–4.8)
LYMPHOCYTES NFR BLD: 20.5 % (ref 18–48)
MAGNESIUM SERPL-MCNC: 1.4 MG/DL (ref 1.6–2.6)
MCH RBC QN AUTO: 34.1 PG (ref 27–31)
MCHC RBC AUTO-ENTMCNC: 33.1 G/DL (ref 32–36)
MCV RBC AUTO: 103 FL (ref 82–98)
MONOCYTES # BLD AUTO: 0.4 K/UL (ref 0.3–1)
MONOCYTES NFR BLD: 4.9 % (ref 4–15)
NEUTROPHILS # BLD AUTO: 5.2 K/UL (ref 1.8–7.7)
NEUTROPHILS NFR BLD: 69.1 % (ref 38–73)
NRBC BLD-RTO: 0 /100 WBC
PHOSPHATE SERPL-MCNC: 3 MG/DL (ref 2.7–4.5)
PLATELET # BLD AUTO: 102 K/UL (ref 150–350)
PMV BLD AUTO: 12.3 FL (ref 9.2–12.9)
POTASSIUM SERPL-SCNC: 4 MMOL/L (ref 3.5–5.1)
PROT SERPL-MCNC: 4.8 G/DL (ref 6–8.4)
RBC # BLD AUTO: 3.31 M/UL (ref 4–5.4)
SODIUM SERPL-SCNC: 135 MMOL/L (ref 136–145)
WBC # BLD AUTO: 7.52 K/UL (ref 3.9–12.7)

## 2020-07-15 PROCEDURE — 36415 COLL VENOUS BLD VENIPUNCTURE: CPT

## 2020-07-15 PROCEDURE — 21400001 HC TELEMETRY ROOM

## 2020-07-15 PROCEDURE — 25000003 PHARM REV CODE 250: Performed by: HOSPITALIST

## 2020-07-15 PROCEDURE — 25000003 PHARM REV CODE 250: Performed by: INTERNAL MEDICINE

## 2020-07-15 PROCEDURE — 80053 COMPREHEN METABOLIC PANEL: CPT

## 2020-07-15 PROCEDURE — 83735 ASSAY OF MAGNESIUM: CPT

## 2020-07-15 PROCEDURE — 85025 COMPLETE CBC W/AUTO DIFF WBC: CPT

## 2020-07-15 PROCEDURE — 84100 ASSAY OF PHOSPHORUS: CPT

## 2020-07-15 RX ORDER — TRAZODONE HYDROCHLORIDE 50 MG/1
50 TABLET ORAL NIGHTLY
Status: DISCONTINUED | OUTPATIENT
Start: 2020-07-15 | End: 2020-07-16 | Stop reason: HOSPADM

## 2020-07-15 RX ORDER — DIAZEPAM 5 MG/1
5 TABLET ORAL EVERY 8 HOURS
Status: DISCONTINUED | OUTPATIENT
Start: 2020-07-15 | End: 2020-07-16 | Stop reason: HOSPADM

## 2020-07-15 RX ORDER — FLUOXETINE HYDROCHLORIDE 20 MG/1
20 CAPSULE ORAL DAILY
Status: DISCONTINUED | OUTPATIENT
Start: 2020-07-15 | End: 2020-07-16 | Stop reason: HOSPADM

## 2020-07-15 RX ADMIN — DIAZEPAM 10 MG: 5 TABLET ORAL at 05:07

## 2020-07-15 RX ADMIN — ONDANSETRON 8 MG: 8 TABLET, ORALLY DISINTEGRATING ORAL at 08:07

## 2020-07-15 RX ADMIN — DIAZEPAM 5 MG: 5 TABLET ORAL at 03:07

## 2020-07-15 RX ADMIN — FOLIC ACID 1 MG: 1 TABLET ORAL at 08:07

## 2020-07-15 RX ADMIN — THERA TABS 1 TABLET: TAB at 08:07

## 2020-07-15 RX ADMIN — DIAZEPAM 5 MG: 5 TABLET ORAL at 09:07

## 2020-07-15 RX ADMIN — TRAZODONE HYDROCHLORIDE 50 MG: 50 TABLET ORAL at 08:07

## 2020-07-15 RX ADMIN — FLUOXETINE 20 MG: 20 CAPSULE ORAL at 08:07

## 2020-07-15 RX ADMIN — Medication 100 MG: at 08:07

## 2020-07-15 RX ADMIN — PANTOPRAZOLE SODIUM 40 MG: 40 TABLET, DELAYED RELEASE ORAL at 08:07

## 2020-07-15 NOTE — SUBJECTIVE & OBJECTIVE
Past Medical History:   Diagnosis Date    Alcohol abuse     History of psychiatric hospitalization      of psychiatric care     Psychiatric problem     Therapy        History reviewed. No pertinent surgical history.    Review of patient's allergies indicates:  No Known Allergies    No current facility-administered medications on file prior to encounter.      No current outpatient medications on file prior to encounter.     Family History     Problem Relation (Age of Onset)    Alcohol abuse Mother        Tobacco Use    Smoking status: Current Every Day Smoker     Packs/day: 1.00     Types: Cigarettes    Smokeless tobacco: Never Used   Substance and Sexual Activity    Alcohol use: Yes     Alcohol/week: 20.0 standard drinks     Types: 20 Shots of liquor per week     Comment: minimum half pint daily; withdrawal shakes/tremors; no seizures    Drug use: No    Sexual activity: Not on file     Review of Systems   Constitutional: Positive for activity change, appetite change, chills and fatigue. Negative for diaphoresis and fever.   HENT: Negative for congestion.    Eyes: Positive for redness.   Respiratory: Positive for cough. Negative for shortness of breath and wheezing.    Cardiovascular: Negative for chest pain.   Gastrointestinal: Negative for abdominal pain, constipation, diarrhea, nausea and vomiting.   Endocrine: Positive for heat intolerance.   Genitourinary: Negative for dysuria.   Musculoskeletal: Positive for myalgias.   Allergic/Immunologic: Negative for immunocompromised state.   Neurological: Negative for dizziness and weakness.   Hematological: Bruises/bleeds easily.   Psychiatric/Behavioral: Positive for sleep disturbance. Negative for confusion and suicidal ideas. The patient is not nervous/anxious.      Objective:     Vital Signs (Most Recent):  Temp: 98 °F (36.7 °C) (07/15/20 0719)  Pulse: 95 (07/15/20 0719)  Resp: 18 (07/15/20 0719)  BP: (!) 118/93 (07/15/20 0719)  SpO2: 99 % (07/15/20 0719)  Vital Signs (24h Range):  Temp:  [97.8 °F (36.6 °C)-98.3 °F (36.8 °C)] 98 °F (36.7 °C)  Pulse:  [85-95] 95  Resp:  [16-18] 18  SpO2:  [98 %-100 %] 99 %  BP: (100-136)/(70-93) 118/93     Weight: 51.5 kg (113 lb 8.6 oz)  Body mass index is 22.17 kg/m².    Physical Exam  Vitals signs and nursing note reviewed.   Constitutional:       General: She is not in acute distress.     Appearance: She is well-developed. She is diaphoretic.   HENT:      Head: Normocephalic and atraumatic.      Mouth/Throat:      Pharynx: No oropharyngeal exudate.   Eyes:      General: No scleral icterus.        Right eye: No discharge.         Left eye: No discharge.      Conjunctiva/sclera: Conjunctivae normal.      Pupils: Pupils are equal, round, and reactive to light.      Comments: Conjunctivae injected   Neck:      Musculoskeletal: Normal range of motion and neck supple.      Thyroid: No thyromegaly.      Vascular: No JVD.      Trachea: No tracheal deviation.   Cardiovascular:      Rate and Rhythm: Regular rhythm. Tachycardia present.      Heart sounds: Normal heart sounds. No murmur. No friction rub. No gallop.    Pulmonary:      Effort: Respiratory distress present.      Breath sounds: Normal breath sounds. No stridor. No wheezing or rales.      Comments: Few squeaks  Chest:      Chest wall: No tenderness.   Abdominal:      General: Bowel sounds are normal. There is no distension.      Palpations: Abdomen is soft. There is no mass.      Tenderness: There is abdominal tenderness. There is no guarding or rebound.      Comments: Mild epigastric tenderness   Musculoskeletal: Normal range of motion.         General: No tenderness.   Lymphadenopathy:      Cervical: No cervical adenopathy.   Skin:     General: Skin is warm.      Coloration: Skin is not pale.      Findings: No erythema or rash.      Comments: Tattoo left upper chest   Neurological:      Mental Status: She is alert and oriented to person, place, and time.      Cranial Nerves: No  cranial nerve deficit.      Motor: No abnormal muscle tone.      Coordination: Coordination normal.      Deep Tendon Reflexes: Reflexes are normal and symmetric. Reflexes normal.   Psychiatric:         Behavior: Behavior normal.         Thought Content: Thought content normal.           CRANIAL NERVES     CN III, IV, VI   Pupils are equal, round, and reactive to light.       Significant Labs:   Recent Results (from the past 24 hour(s))   Comprehensive Metabolic Panel (CMP)    Collection Time: 07/15/20  5:37 AM   Result Value Ref Range    Sodium 135 (L) 136 - 145 mmol/L    Potassium 4.0 3.5 - 5.1 mmol/L    Chloride 103 95 - 110 mmol/L    CO2 28 23 - 29 mmol/L    Glucose 81 70 - 110 mg/dL    BUN, Bld 15 6 - 20 mg/dL    Creatinine 0.5 0.5 - 1.4 mg/dL    Calcium 8.5 (L) 8.7 - 10.5 mg/dL    Total Protein 4.8 (L) 6.0 - 8.4 g/dL    Albumin 2.7 (L) 3.5 - 5.2 g/dL    Total Bilirubin 0.6 0.1 - 1.0 mg/dL    Alkaline Phosphatase 62 55 - 135 U/L     (H) 10 - 40 U/L    ALT 60 (H) 10 - 44 U/L    Anion Gap 4 (L) 8 - 16 mmol/L    eGFR if African American >60 >60 mL/min/1.73 m^2    eGFR if non African American >60 >60 mL/min/1.73 m^2   Magnesium    Collection Time: 07/15/20  5:37 AM   Result Value Ref Range    Magnesium 1.4 (L) 1.6 - 2.6 mg/dL   Phosphorus    Collection Time: 07/15/20  5:37 AM   Result Value Ref Range    Phosphorus 3.0 2.7 - 4.5 mg/dL   CBC with Automated Differential    Collection Time: 07/15/20  5:38 AM   Result Value Ref Range    WBC 7.52 3.90 - 12.70 K/uL    RBC 3.31 (L) 4.00 - 5.40 M/uL    Hemoglobin 11.3 (L) 12.0 - 16.0 g/dL    Hematocrit 34.1 (L) 37.0 - 48.5 %    Mean Corpuscular Volume 103 (H) 82 - 98 fL    Mean Corpuscular Hemoglobin 34.1 (H) 27.0 - 31.0 pg    Mean Corpuscular Hemoglobin Conc 33.1 32.0 - 36.0 g/dL    RDW 13.2 11.5 - 14.5 %    Platelets 102 (L) 150 - 350 K/uL    MPV 12.3 9.2 - 12.9 fL    Immature Granulocytes 0.3 0.0 - 0.5 %    Gran # (ANC) 5.2 1.8 - 7.7 K/uL    Immature Grans (Abs)  0.02 0.00 - 0.04 K/uL    Lymph # 1.5 1.0 - 4.8 K/uL    Mono # 0.4 0.3 - 1.0 K/uL    Eos # 0.3 0.0 - 0.5 K/uL    Baso # 0.07 0.00 - 0.20 K/uL    nRBC 0 0 /100 WBC    Gran% 69.1 38.0 - 73.0 %    Lymph% 20.5 18.0 - 48.0 %    Mono% 4.9 4.0 - 15.0 %    Eosinophil% 4.3 0.0 - 8.0 %    Basophil% 0.9 0.0 - 1.9 %    Differential Method Automated          Significant Imaging:   None done in the ED    EKG:  Sinus tachy, rate 114  Inferior T wave abnormality  QTc of 642
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
## 2020-07-15 NOTE — NURSING
Report given to receiving nurse Kia. Pt awake in bed. Walking rounds completed. Pt assessed, NAD noted.     24hr chart check completed.

## 2020-07-15 NOTE — PLAN OF CARE
Problem: Fall Injury Risk  Goal: Absence of Fall and Fall-Related Injury  Intervention: Identify and Manage Contributors to Fall Injury Risk  Flowsheets (Taken 7/15/2020 0348)  Self-Care Promotion:   independence encouraged   BADL personal objects within reach   BADL personal routines maintained  Medication Review/Management: medications reviewed  Intervention: Promote Injury-Free Environment  Flowsheets (Taken 7/15/2020 0348)  Safety Promotion/Fall Prevention:   assistive device/personal item within reach   bed alarm set   /camera at bedside   room near unit station   side rails raised x 2   instructed to call staff for mobility   Fall Risk reviewed with patient/family   high risk medications identified     Problem: Infection  Goal: Infection Symptom Resolution  Intervention: Prevent or Manage Infection  Flowsheets (Taken 7/15/2020 0348)  Fever Reduction/Comfort Measures:   lightweight clothing   lightweight bedding  Infection Management: aseptic technique maintained  Isolation Precautions: contact precautions maintained

## 2020-07-16 VITALS
HEIGHT: 60 IN | WEIGHT: 114.63 LBS | OXYGEN SATURATION: 100 % | DIASTOLIC BLOOD PRESSURE: 76 MMHG | TEMPERATURE: 98 F | RESPIRATION RATE: 18 BRPM | HEART RATE: 89 BPM | SYSTOLIC BLOOD PRESSURE: 118 MMHG | BODY MASS INDEX: 22.51 KG/M2

## 2020-07-16 LAB
ALBUMIN SERPL BCP-MCNC: 2.6 G/DL (ref 3.5–5.2)
ALP SERPL-CCNC: 65 U/L (ref 55–135)
ALT SERPL W/O P-5'-P-CCNC: 87 U/L (ref 10–44)
ANION GAP SERPL CALC-SCNC: 5 MMOL/L (ref 8–16)
AST SERPL-CCNC: 168 U/L (ref 10–40)
BASOPHILS # BLD AUTO: 0.04 K/UL (ref 0–0.2)
BASOPHILS NFR BLD: 0.6 % (ref 0–1.9)
BILIRUB SERPL-MCNC: 0.3 MG/DL (ref 0.1–1)
BUN SERPL-MCNC: 12 MG/DL (ref 6–20)
CALCIUM SERPL-MCNC: 8.5 MG/DL (ref 8.7–10.5)
CHLORIDE SERPL-SCNC: 105 MMOL/L (ref 95–110)
CO2 SERPL-SCNC: 27 MMOL/L (ref 23–29)
CREAT SERPL-MCNC: 0.6 MG/DL (ref 0.5–1.4)
DIFFERENTIAL METHOD: ABNORMAL
EOSINOPHIL # BLD AUTO: 0.3 K/UL (ref 0–0.5)
EOSINOPHIL NFR BLD: 4.9 % (ref 0–8)
ERYTHROCYTE [DISTWIDTH] IN BLOOD BY AUTOMATED COUNT: 13.3 % (ref 11.5–14.5)
EST. GFR  (AFRICAN AMERICAN): >60 ML/MIN/1.73 M^2
EST. GFR  (NON AFRICAN AMERICAN): >60 ML/MIN/1.73 M^2
GLUCOSE SERPL-MCNC: 82 MG/DL (ref 70–110)
HCT VFR BLD AUTO: 35.8 % (ref 37–48.5)
HGB BLD-MCNC: 11.7 G/DL (ref 12–16)
IMM GRANULOCYTES # BLD AUTO: 0.03 K/UL (ref 0–0.04)
IMM GRANULOCYTES NFR BLD AUTO: 0.4 % (ref 0–0.5)
LYMPHOCYTES # BLD AUTO: 1.6 K/UL (ref 1–4.8)
LYMPHOCYTES NFR BLD: 23.2 % (ref 18–48)
MAGNESIUM SERPL-MCNC: 1.5 MG/DL (ref 1.6–2.6)
MCH RBC QN AUTO: 33.6 PG (ref 27–31)
MCHC RBC AUTO-ENTMCNC: 32.7 G/DL (ref 32–36)
MCV RBC AUTO: 103 FL (ref 82–98)
MONOCYTES # BLD AUTO: 0.5 K/UL (ref 0.3–1)
MONOCYTES NFR BLD: 7.8 % (ref 4–15)
NEUTROPHILS # BLD AUTO: 4.3 K/UL (ref 1.8–7.7)
NEUTROPHILS NFR BLD: 63.1 % (ref 38–73)
NRBC BLD-RTO: 0 /100 WBC
PHOSPHATE SERPL-MCNC: 3.8 MG/DL (ref 2.7–4.5)
PLATELET # BLD AUTO: 115 K/UL (ref 150–350)
PMV BLD AUTO: 12.4 FL (ref 9.2–12.9)
POTASSIUM SERPL-SCNC: 4.3 MMOL/L (ref 3.5–5.1)
PROT SERPL-MCNC: 5 G/DL (ref 6–8.4)
RBC # BLD AUTO: 3.48 M/UL (ref 4–5.4)
SODIUM SERPL-SCNC: 137 MMOL/L (ref 136–145)
WBC # BLD AUTO: 6.77 K/UL (ref 3.9–12.7)

## 2020-07-16 PROCEDURE — 83735 ASSAY OF MAGNESIUM: CPT

## 2020-07-16 PROCEDURE — 80053 COMPREHEN METABOLIC PANEL: CPT

## 2020-07-16 PROCEDURE — 84100 ASSAY OF PHOSPHORUS: CPT

## 2020-07-16 PROCEDURE — 25000003 PHARM REV CODE 250: Performed by: INTERNAL MEDICINE

## 2020-07-16 PROCEDURE — 85025 COMPLETE CBC W/AUTO DIFF WBC: CPT

## 2020-07-16 PROCEDURE — 36415 COLL VENOUS BLD VENIPUNCTURE: CPT

## 2020-07-16 PROCEDURE — 25000003 PHARM REV CODE 250: Performed by: HOSPITALIST

## 2020-07-16 RX ORDER — FLUOXETINE HYDROCHLORIDE 20 MG/1
20 CAPSULE ORAL DAILY
Qty: 30 CAPSULE | Refills: 0 | Status: SHIPPED | OUTPATIENT
Start: 2020-07-16 | End: 2020-08-15

## 2020-07-16 RX ORDER — TRAZODONE HYDROCHLORIDE 50 MG/1
50 TABLET ORAL NIGHTLY
Qty: 30 TABLET | Refills: 0 | Status: SHIPPED | OUTPATIENT
Start: 2020-07-16 | End: 2020-08-15

## 2020-07-16 RX ORDER — LANOLIN ALCOHOL/MO/W.PET/CERES
100 CREAM (GRAM) TOPICAL DAILY
Start: 2020-07-16

## 2020-07-16 RX ORDER — FOLIC ACID 1 MG/1
1 TABLET ORAL DAILY
Refills: 0
Start: 2020-07-16 | End: 2021-07-16

## 2020-07-16 RX ADMIN — PANTOPRAZOLE SODIUM 40 MG: 40 TABLET, DELAYED RELEASE ORAL at 09:07

## 2020-07-16 RX ADMIN — Medication 100 MG: at 09:07

## 2020-07-16 RX ADMIN — FOLIC ACID 1 MG: 1 TABLET ORAL at 09:07

## 2020-07-16 RX ADMIN — THERA TABS 1 TABLET: TAB at 09:07

## 2020-07-16 RX ADMIN — POTASSIUM CHLORIDE: 2 INJECTION, SOLUTION, CONCENTRATE INTRAVENOUS at 03:07

## 2020-07-16 RX ADMIN — FLUOXETINE 20 MG: 20 CAPSULE ORAL at 09:07

## 2020-07-16 RX ADMIN — DIAZEPAM 5 MG: 5 TABLET ORAL at 05:07

## 2020-07-16 NOTE — NURSING
In preparation for discharge, d/c'd pt's saline lock, applied pressure to site, secured gauze and coban, no redness or swelling noted. Instructions given.Reviewed all new meds, continued medications, follow up appointments and signs and symptoms to report to PCP or seek emergency medical care. Pt verbalized understanding to all instructions and education. Pt denies any complaints or concerns at this time. Pt was transported off the unit to car for discharge.

## 2020-07-16 NOTE — PLAN OF CARE
WRITTEN HEALTHCARE DISCHARGE INFORMATION     Things that YOU are RESPONSIBLE for to Manage Your Care At Home:    1. Getting your prescriptions filled.  2. Taking you medications as directed. DO NOT MISS ANY DOSES!  3. Going to your follow-up doctor appointments. This is important because it allows the doctor to monitor your progress and to determine if any changes need to be made to your treatment plan.    If you are unable to make your follow up appointments, please call the number listed and reschedule this appointment.     ____________HELP AT HOME____________________    Experiencing any SIGNS or SYMPTOMS: YOU CAN    Schedule a same day appopintment with your Primary Care Doctor or  you can call [facility] On Call Nurse Care Line for 24/7 assistance at [phone]    If you are experience any signs or symptoms that have become severe, Call 911 and come to your nearest Emergency Room.    Thank you for choosing [facility] and allowing us to care for you.   From your care management team: Tatiana Geiger LMSW, [phone]  Follow-up Information     [facility]. Schedule an appointment as soon as possible for a visit today.    Why: [facility] WILL CONTACT PATIENT TO SCHEDULE APPOINTMENT. PLEASE EMAIL YOUR INSURANCE IMFORMATION AND PHOTO ID TO [email or web address]  Contact information:  [address]  [place] [address]  [phone]

## 2020-07-16 NOTE — PROGRESS NOTES
HOW TO MANAGE YOUR CARE  AT HOME:  TN taught Symptoms and Problems for ALCOHOL ABUSE home care with pt and  with teach back:  1. CHEST PAIN, 2. CALLING [facility] AND [facility], SEIZURES, 4. CHEST PAINS. TN placed education sheet in [employer]     HELP AT HOME TO ASSIST WITH PATIENT'S RECOVERY IS MOTHER, VIANCA.    Patient has preference for [facility] in [place]      TN taught patient about things she is responsible for when discharged TO HELP WITH HER RECOVERY:   How to Manage Her Care At Home:  1. Getting her prescriptions filled.  2. Taking her medications as directed. DO NOT MISS ANY DOSES!  3. Going to her follow-up doctor appointments.   .  Luz Marina Ernst, RN, BSN, STN CCM
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
## 2020-07-16 NOTE — NURSING
Report received from VI Lloyd. Visualized patient and assessed patient's overall condition and appearance. No acute distress noted. Will continue to monitor
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
